# Patient Record
Sex: FEMALE | Race: WHITE | NOT HISPANIC OR LATINO | Employment: OTHER | URBAN - METROPOLITAN AREA
[De-identification: names, ages, dates, MRNs, and addresses within clinical notes are randomized per-mention and may not be internally consistent; named-entity substitution may affect disease eponyms.]

---

## 2017-01-16 ENCOUNTER — HOSPITAL ENCOUNTER (OUTPATIENT)
Dept: MAMMOGRAPHY | Facility: CLINIC | Age: 72
Discharge: HOME | End: 2017-01-16
Payer: MEDICARE

## 2017-01-16 DIAGNOSIS — Z12.31 ENCOUNTER FOR SCREENING MAMMOGRAM FOR MALIGNANT NEOPLASM OF BREAST: ICD-10-CM

## 2017-01-16 PROCEDURE — G0202 SCR MAMMO BI INCL CAD: HCPCS

## 2017-01-23 ENCOUNTER — ALLSCRIPTS OFFICE VISIT (OUTPATIENT)
Dept: OTHER | Facility: OTHER | Age: 72
End: 2017-01-23

## 2017-06-14 ENCOUNTER — APPOINTMENT (OUTPATIENT)
Dept: RADIOLOGY | Facility: CLINIC | Age: 72
End: 2017-06-14
Payer: MEDICARE

## 2017-06-14 ENCOUNTER — ALLSCRIPTS OFFICE VISIT (OUTPATIENT)
Dept: OTHER | Facility: OTHER | Age: 72
End: 2017-06-14

## 2017-06-14 DIAGNOSIS — M17.12 PRIMARY OSTEOARTHRITIS OF LEFT KNEE: ICD-10-CM

## 2017-06-14 DIAGNOSIS — M25.562 PAIN IN LEFT KNEE: ICD-10-CM

## 2017-06-14 DIAGNOSIS — M25.569 PAIN IN KNEE: ICD-10-CM

## 2017-06-14 PROCEDURE — 73562 X-RAY EXAM OF KNEE 3: CPT

## 2017-06-23 ENCOUNTER — GENERIC CONVERSION - ENCOUNTER (OUTPATIENT)
Dept: OTHER | Facility: OTHER | Age: 72
End: 2017-06-23

## 2017-09-13 ENCOUNTER — GENERIC CONVERSION - ENCOUNTER (OUTPATIENT)
Dept: OTHER | Facility: OTHER | Age: 72
End: 2017-09-13

## 2017-09-19 ENCOUNTER — ALLSCRIPTS OFFICE VISIT (OUTPATIENT)
Dept: OTHER | Facility: OTHER | Age: 72
End: 2017-09-19

## 2017-11-22 ENCOUNTER — GENERIC CONVERSION - ENCOUNTER (OUTPATIENT)
Dept: OTHER | Facility: OTHER | Age: 72
End: 2017-11-22

## 2017-12-07 ENCOUNTER — GENERIC CONVERSION - ENCOUNTER (OUTPATIENT)
Dept: OTHER | Facility: OTHER | Age: 72
End: 2017-12-07

## 2017-12-07 ENCOUNTER — HOSPITAL ENCOUNTER (OUTPATIENT)
Dept: NON INVASIVE DIAGNOSTICS | Facility: CLINIC | Age: 72
Discharge: HOME | End: 2017-12-07
Payer: MEDICARE

## 2017-12-07 DIAGNOSIS — I83.92 ASYMPTOMATIC VARICOSE VEINS OF LEFT LOWER EXTREMITY: ICD-10-CM

## 2017-12-07 PROCEDURE — 93970 EXTREMITY STUDY: CPT

## 2017-12-19 ENCOUNTER — ALLSCRIPTS OFFICE VISIT (OUTPATIENT)
Dept: OTHER | Facility: OTHER | Age: 72
End: 2017-12-19

## 2017-12-19 DIAGNOSIS — I83.92 ASYMPTOMATIC VARICOSE VEINS OF LEFT LOWER EXTREMITY: ICD-10-CM

## 2017-12-20 NOTE — PROGRESS NOTES
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)    Discussion/Summary  Discussion Summary:   Reflux study reviewed with patient  Left leg does present with deep venous incompetence however the greater saphenous is surgically absent and no evidence of a perforating veins  I had a long discussion with her in the office today while she does have secondary varicosities which are symptomatic in view of her age group I feel that the wrist possibly outweigh the benefits of the surgery from an anesthesia standpoint  We had further discussion regarding the use of light compression possibly elite are type material which would help compress her secondary varicosities and likely give her some relief  I also asked her to remain active the more active she is the less symptoms she will have from a venous reflux standpoint  Chief Complaint  Chief Complaint Free Text Note Form:  I am here to review my test results  Ms Ines Pop is here to review her LEVDR he had on 12/07/2017  Pt was seen on 09/19/2017  Pt states none of her symptoms have changed since the last time we seen her our office  Pt still c/o burning in her legs as well as achiness/tiredness heaviness feeling in her legs  Pt states her right leg is worse than her left  Pt does wear compression stockings  History of Present Illness  HPI: History of having some aching after long days standing  Review of Systems  Complete Female - Vasc:  Constitutional: No fever or chills, feels well, no tiredness, no recent weight gain or weight loss  Eyes: itching of the eyes,-- no sudden vision loss,-- no blurred vision-- and-- no double vision, but-- no eye pain,-- no eyesight problems,-- no dryness of the eyes,-- eyes not red,-- no purulent discharge from the eyes-- and-- wears glasses  ENT: hearing loss, but-- no earache,-- no nosebleeds,-- no sore throat,-- no nasal discharge-- and-- no hoarseness    Cardiovascular: no bleeding veins, but-- regular heart rate,-- no chest pain,-- no intermittent leg claudication,-- painful veins,-- no palpitations-- and-- leg pain with walking  Respiratory: No sob, no wheezing, no cough, no sob with exertion, no orthopnea  Gastrointestinal: constipation, but-- no nausea,-- no vomiting,-- no diarrhea-- and-- no blood in stools  no hemorrhoids  Genitourinary: no dysuria, no Hematuria,no urinary incontinence  Musculoskeletal: no limb pain, no limb swelling  Integumentary: no rash, no lesions, no wounds, no ulcer  Neurological: no dementia, no headache, no numbness, no limb weakness, no dizziness, no difficulty walking  Psychiatric: depression-- and-- no mood disorder, but-- no anxiety  Hematologic/Lymphatic: no bleeding disorder, no easy bruising  ROS Reviewed:   ROS reviewed  Active Problems  1  Abnormal cardiovascular stress test (794 39) (R94 39)   2  Anxiety (300 00) (F41 9)   3  Asthma (493 90) (J45 909)   4  Cataract of left eye (366 9) (H26 9)   5  Chronic pain of left knee (371 15,565 43) (M25 562,G89 29)   6  Depression (311) (F32 9)   7  Elevated liver enzymes (790 5) (R74 8)   8  Esophagitis, reflux (530 11) (K21 0)   9  Exercise-induced asthma (493 81) (J45 990)   10  Hyperlipidemia (272 4) (E78 5)   11  Hypertension (401 9) (I10)   12  Insomnia (780 52) (G47 00)   13  Knee pain (719 46) (M25 569)   14  Leg cramps (729 82) (R25 2)   15  Lightheadedness (780 4) (R42)   16  Need for revaccination (V05 9) (Z23)   17  Nonalcoholic fatty liver disease (571 8) (K76 0)   18  Osteoarthritis of left knee (715 96) (M17 12)   19  Overactive bladder (596 51) (N32 81)   20  Palpitations (785 1) (R00 2)   21  Postmenopausal atrophic vaginitis (627 3) (N95 2)   22  Sleep apnea (780 57) (G47 30)   23  Varicose veins of left lower extremity (454 9) (I83 92)   24  Ventricular tachycardia (427 1) (I47 2)    Past Medical History  1  History of Acute bronchitis due to Mycoplasma pneumoniae (466 0,041 81) (J20 0)   2  History of Acute lower UTI (urinary tract infection) (599 0) (N39 0)   3  History of acute bronchitis (V12 69) (Z87 09)   4  History of cataract (V12 49) (Z86 69)   5  History of urinary frequency (V13 09) (Z87 898)   6  History of urinary tract infection (V13 02) (Z87 440)   7  History of urinary tract infection (V13 02) (Z87 440)   8  History of Scalp irritation (709 9) (R23 8)   9  Screening for genitourinary condition (V81 6) (Z13 89)   10  History of Vaginal candidiasis (112 1) (B37 3)  Active Problems And Past Medical History Reviewed: The active problems and past medical history were reviewed and updated today  Surgical History  1  History of Abdominoplasty   2  History of Abdomino-Vaginal Vesical Neck Suspension   3  History of Breast Surgery Lumpectomy   4  History of Cataract Surgery   5  History of Knee Arthroscopy (Therapeutic)   6  History of Knee Replacement   7  History of Paravaginal Defect Repair   8  History of Reported Hx Of 'Facelift'   9  History of Tubal Ligation   10  History of Venous Ligation With Stripping  Surgical History Reviewed: The surgical history was reviewed and updated today  Family History  Mother    1  Family history of mental disorder (V17 0) (Z81 8)  Father    2  Family history of cardiac disorder (V17 49) (Z82 49)  Family History Reviewed: The family history was reviewed and updated today  Social History   · History of second hand smoke exposure (V87 39) (Z77 22)   ·    · Minimum alcohol consumption   · Never a smoker   · No drug use   · Retired  Social History Reviewed: The social history was reviewed and updated today  Current Meds   1  Escitalopram Oxalate 20 MG Oral Tablet; Take 1 tablet daily; Therapy: 09MGZ7918 to (Last ZA:03ARS8086)  Requested for: 23Jan2017 Ordered   2  Flovent HFA 44 MCG/ACT Inhalation Aerosol; INHALE 2 PUFFS TWICE DAILY;  Therapy: 39VPH4993 to (Evaluate:58Qgt5841)  Requested for: 90WTF8612; Last Rx:38Rxc2764 Ordered   3  Metoprolol Succinate  MG Oral Tablet Extended Release 24 Hour; TAKE 1 TABLET ONCE DAILY; Therapy: 18KHU5383 to (Evaluate:06Feb2018)  Requested for: 10Aug2017; Last Rx:10Aug2017 Ordered   4  Oxybutynin Chloride ER 10 MG Oral Tablet Extended Release 24 Hour; take one tablet by mouth every day; Therapy: 75HWX9946 to (Evaluate:06Feb2018)  Requested for: 10Aug2017; Last Rx:10Aug2017 Ordered   5  Pantoprazole Sodium 40 MG Oral Tablet Delayed Release; TAKE 1 TABLET DAILY; Therapy: 80Ymd1044 to (Evaluate:27Jan2018)  Requested for: 35EUK8365; Last Rx:10Gmf3489 Ordered   6  ProAir  (90 Base) MCG/ACT Inhalation Aerosol Solution; INHALE 2 PUFFS EVERY 4-6 HOURS AS NEEDED for shortness of breath; Therapy: 73JDI5471 to (Last Rx:90Lpp3579)  Requested for: 79TTX0209 Ordered  Medication List Reviewed: The medication list was reviewed and updated today  Allergies  1  Sanctura   2  Vioxx TABS    Physical Exam    Distal Pulse Exam: Secondary varicosity left lateral thigh  Results/Data  Diagnostic Studies Reviewed Vasc: I personally reviewed the films/images/results in the office today  My interpretation follows  Vascular Study Review Reflux study reviewed        Signatures   Electronically signed by : Rody Winslow MD; Dec 19 2017 10:31AM EST                       (Author)

## 2018-01-11 NOTE — MISCELLANEOUS
Message   Recorded as Task   Date: 05/20/2016 07:21 PM, Created By: Lewis Camargo   Task Name: Follow Up   Assigned To: Lewis Camargo   Regarding Patient: Maria Del Carmen Brumfield, Status: Active   Comment:    Lewis Camargo - 20 May 2016 7:21 PM     TASK CREATED  Call the patient about her LFTs  FarfanJudith - 24 May 2016 10:00 AM     TASK EDITED  I spoke with the patient on 05/23/2016 and reviewed her labs  her LFT's are still elevated and have increased  I am going to send her for an ultrasound of the abdomen with attention to the liver for further evaluation  We will follow-up with results  She should follow-up with cardiology as scheduled  Plan  Elevated liver enzymes    · * US ABDOMEN COMPLETE; Status:Active;  Requested for:02Ruh3370;     Signatures   Electronically signed by : Dina Virk DO; May 24 2016 10:01AM EST                       (Author)

## 2018-01-13 VITALS
BODY MASS INDEX: 32.3 KG/M2 | SYSTOLIC BLOOD PRESSURE: 120 MMHG | DIASTOLIC BLOOD PRESSURE: 68 MMHG | HEIGHT: 66 IN | WEIGHT: 201 LBS | HEART RATE: 60 BPM

## 2018-01-13 NOTE — PROGRESS NOTES
Assessment    1  Encounter for preventive health examination (V70 0) (Z00 00)    Plan  Encounter for screening mammogram for breast cancer    · * MAMMO SCREENING BILATERAL W CAD; Status:Active; Requested for:16Nov2016; Health Maintenance    · Begin a limited exercise program ; Status:Complete;   Done: 71WZA3838   · Eat a low fat and low cholesterol diet ; Status:Complete;   Done: 71NHC9474   · Some eating tips that can help you lose weight ; Status:Complete;   Done: 72NCW8596   · Stretch and warm up your muscles during the first 10 minutes , then cool down your  muscles for the last 10 minutes of exercise ; Status:Complete;   Done: 64UBC0573   · There are many exercise options for seniors ; Status:Complete;   Done: 55LAP1548   · There are ways to decrease your stress and improve your sense of well-being  We  encourage you to keep active and exercise regularly  Make time to take care of yourself  and participate in activities that you enjoy  Stay connected to friends and family that can  support and comfort you  If at any time you have thoughts of harming yourself or  someone else, contact us immediately ; Status:Complete;   Done: 39VNZ9283   · We encourage all of our patients to exercise regularly  30 minutes of exercise or physical  activity five or more days a week is recommended for children and adults ;  Status:Complete;   Done: 35KXW8939   · We recommend that you change your eating habits slowly ; Status:Complete;   Done:  54OXO9576   · We recommend you modify your diet to achieve and maintain a healthy weight  Being  underweight may increase your risk of developing health problems from vitamin and  mineral deficiencies  We recommend a balanced diet rich in fruits and vegetables  You  may also consider increasing your calorie intake by eating more frequently or adding  nuts, avocados, and low-fat cheese or milk to your meals    Please let us know  if you would like to learn more about your nutrition and calorie needs, and additional  options to help you achieve your weight goals ; Status:Complete;   Done: 67GGL5600   · Medicare Annual Wellness Visit; Status:Complete;   Done: 37FNA1023 12:00AM   · Follow-up visit in 1 year Evaluation and Treatment  Follow-up  Status: Complete  Done:  77UKH3874  Hyperlipidemia, Hypertension    · Follow-up visit in 6 months Evaluation and Treatment  Follow-up  Status: Complete   Done: 76YBM2008  Screening for genitourinary condition    · *VB - Urinary Incontinence Screen (Dx Z13 89 Screen for UI); Status:Complete;   Done:  41PZL9102 12:00AM    Discussion/Summary    Brenden Talamantes is doing very well  She will go for the testing as ordered and we will see her in 6 months for a checkup  Impression: Subsequent Annual Wellness Visit, with preventive exam as well as age and risk appropriate counseling completed  Cardiovascular screening and counseling: the risks and benefits of screening were discussed and screening is current  Diabetes screening and counseling: the risks and benefits of screening were discussed and screening is current  Colorectal cancer screening and counseling: the risks and benefits of screening were discussed and screening is current  Breast cancer screening and counseling: the risks and benefits of screening were discussed and screening is current  Cervical cancer screening and counseling: the risks and benefits of screening were discussed  Osteoporosis screening and counseling: the risks and benefits of screening were discussed and screening is current  Abdominal aortic aneurysm screening and counseling: screening not indicated  Glaucoma screening and counseling: the risks and benefits of screening were discussed and screening is current  HIV screening and counseling: the patient declines screening   Immunizations: the risks and benefits of influenza vaccination were discussed with the patient, influenza vaccine is up to date this year, the lifetime pneumococcal vaccine has been completed, hepatitis B vaccination series is not indicated at this time due to the patient's low risk of joslyn the disease, the risks and benefits of the Zostavax vaccine were discussed with the patient, Zostavax vaccination up to date, the risks and benefits of the Td vaccine were discussed with the patient, Td vaccination up to date, the risks and benefits of the Tdap vaccine were discussed with the patient and Tdap vaccination up to date  Advance Directive Planning: complete and up to date  Chief Complaint  Medicare Wellness Visit  Advance Directives  Advance Directive [facility]:   YES - Patient has an advance health care directive  The patient has a living will located  in patient's home  Capacity/Competence: This patient has full decision making capacity for discussion of advance care planning and This patient has full decision making competency for discussion of advance care planning  Summary of Advance Directive Conversation  The patient will bring in a copy  History of Present Illness  HPI: Lynn Yepez is a 73 y/o female who presents for a Medicare Physical today  She is feeling well  She has no complaints  She is stable on her current medications  There are no new problems today  Welcome to [employer] and Wellness Visits: The patient is being seen for the subsequent annual wellness visit  Medicare Screening and Risk Factors   Hospitalizations: no previous hospitalizations and she has been hospitalized 0 times  Once per lifetime medicare screening tests: ECG (sees Cardiology)  Medicare Screening Tests Risk Questions   Abdominal aortic aneurysm risk assessment: none indicated  Osteoporosis risk assessment: , female gender and over 48years of age  HIV risk assessment: none indicated  Drug and Alcohol Use: The patient has never smoked cigarettes  The patient reports occasional alcohol use  She has never used illicit drugs     Diet and Physical Activity: Current diet includes well balanced meals, limited junk food, 2 servings of fruit per day, 2 servings of vegetables per day, 1 servings of meat per day, 1 servings of whole grains per day, 2 servings of simple carbohydrates per day, 3 servings of dairy products per day, 1-2 cups of coffee per day, 0 cups of tea per day, 0 cans of regular soda per day, 0 cans of diet soda per day and Drinks a lot of water  She exercises 3-4 times per week  Exercise: walking 45-60 minutes per day  Mood Disorder and Cognitive Impairment Screening: PHQ-9 Depression Scale   Over the past 2 weeks, how often have you been bothered by the following problems? 1 ) Little interest or pleasure in doing things? Not at all    2 ) Feeling down, depressed or hopeless? Not at all    3 ) Trouble falling asleep or sleeping too much? Not at all    4 ) Feeling tired or having little energy? Not at all    5 ) Poor appetite or overeating? Not at all    6 ) Feeling bad about yourself, or that you are a failure, or have let yourself or your family down? Not at all    7 ) Trouble concentrating on things, such as reading a newspaper or watching television? Not at all    8 ) Moving or speaking so slowly that other people could have noticed, or the opposite, moving or speaking faster than usual? Not at all    9 ) Thoughts that you would be off dead or of hurting yourself in some way? Not at all  TOTAL SCORE: 0    Depression screening  negative for symptoms  She denies feeling down, depressed, or hopeless over the past two weeks  She denies feeling little interest or pleasure in doing things over the past two weeks  Cognitive impairment screening: denies difficulty learning/retaining new information, denies difficulty handling complex tasks, denies difficulty with reasoning, denies difficulty with spatial ability and orientation, denies difficulty with language and denies difficulty with behavior     Functional Ability/Level of Safety: Hearing is slightly decreased and a hearing aid is not used  She reports hearing difficulties  The patient is currently able to do activities of daily living without limitations, able to do instrumental activities of daily living without limitations, able to participate in social activities without limitations and able to drive without limitations  Activities of daily living details: does not need help using the phone, no transportation help needed, does not need help shopping, no meal preparation help needed, does not need help doing housework, does not need help doing laundry, does not need help managing medications and does not need help managing money  Fall risk factors:  polypharmacy, mobility impairment, antidepressant use, urinary incontinence, up and go test was unsteady or greater than thirty seconds and She has had treatments for incontinence  , but The patient fell 0 times in the past 12 months , no alcohol use, no deconditioning, no postural hypotension, no sedative use, no visual impairment, no antihypertensive use, no cognitive impairment and no previous fall  Home safety risk factors:  no grab bars in the bathroom and SHe is looking into getting a grab bar in the bathroom  , but no unfamiliar surroundings, no loose rugs, no poor household lighting, no uneven floors, no household clutter and handrails on the stairs  Co-Managers and Medical Equipment/Suppliers: See Patient Care Team   Reviewed Updated [facility]:   Last Medicare Wellness Visit Information was reviewed, patient interviewed and updates made to the record today        Patient Care Team    Care Team Member Role Specialty Office Number   [facility]  Ophthalmology [phone]   [facility]  Cardiology [phone]   Lainey Mcgowan DO  [facility] Medicine [phone]   Caitlin Ignacio MD  Gastroenterology Adult [phone]   [facility] [address] [phone]     Review of Systems    Constitutional: as noted in HPI  Head and Face: as noted in HPI  Eyes: as noted in HPI    ENT: as noted in HPI  Cardiovascular: as noted in HPI  Respiratory: as noted in HPI  Gastrointestinal: as noted in HPI  Genitourinary: as noted in HPI  Musculoskeletal: as noted in HPI  Active Problems    1  Abnormal cardiovascular stress test (794 39) (R94 39)   2  Anxiety (300 00) (F41 9)   3  Asthma (493 90) (J45 909)   4  Cataract of left eye (366 9) (H26 9)   5  Depression (311) (F32 9)   6  Elevated liver enzymes (790 5) (R74 8)   7  Esophagitis, reflux (530 11) (K21 0)   8  Exercise-induced asthma (493 81) (J45 990)   9  Hyperlipidemia (272 4) (E78 5)   10  Hypertension (401 9) (I10)   11  Insomnia (780 52) (G47 00)   12  Leg cramps (729 82) (R25 2)   13  Lightheadedness (780 4) (R42)   14  Nonalcoholic fatty liver disease (571 8) (K76 0)   15  Overactive bladder (596 51) (N32 81)   16  Palpitations (785 1) (R00 2)   17  Postmenopausal atrophic vaginitis (627 3) (N95 2)   18  Sleep apnea (780 57) (G47 30)   19  Ventricular tachycardia (427 1) (I47 2)    Past Medical History    · History of Acute bronchitis due to Mycoplasma pneumoniae (466 0,041 81) (J20 0)   · History of Acute lower UTI (urinary tract infection) (599 0) (N39 0)   · History of acute bronchitis (V12 69) (Z87 09)   · History of cataract (V12 49) (Z86 69)   · History of urinary frequency (V13 09) (G25 990)   · History of urinary tract infection (V13 02) (Z87 440)   · History of urinary tract infection (V13 02) (Z87 440)   · History of Scalp irritation (709 9) (R23 8)   · History of Vaginal candidiasis (112 1) (B37 3)    The active problems and past medical history were reviewed and updated today        Surgical History    · History of Abdominoplasty   · History of Abdomino-Vaginal Vesical Neck Suspension   · History of Breast Surgery Lumpectomy   · History of Cataract Surgery   · History of Knee Arthroscopy   · History of Knee Replacement   · History of Paravaginal Defect Repair   · History of Reported Hx Of 'Facelift'   · History of Tubal Ligation   · History of Venous Ligation With Stripping    The surgical history was reviewed and updated today  Family History  Mother    · Family history of mental disorder (V17 0) (Z81 8)  Father    · Family history of cardiac disorder (V17 49) (Z82 49)    The family history was reviewed and updated today  Social History    · History of second hand smoke exposure (V87 39) (Z77 22)   ·    · Minimum alcohol consumption   · Never a smoker   · No drug use   · Retired  The social history was reviewed and updated today  The social history was reviewed and is unchanged  Current Meds   1  Escitalopram Oxalate 10 MG Oral Tablet; TAKE 1 AND 1/2 TABLETS DAILY; Therapy: 14XEK4340 to (Evaluate:24Llz0381)  Requested for: 11HEB0962; Last   Rx:13Jun2016 Ordered   2  Flovent HFA 44 MCG/ACT Inhalation Aerosol; INHALE 2 PUFFS TWICE DAILY; Therapy: 69IOO6896 to (Evaluate:09Umy4966)  Requested for: 65NPO6613; Last   Rx:39Nxb5652 Ordered   3  Metoprolol Succinate  MG Oral Tablet Extended Release 24 Hour; TAKE 1   TABLET ONCE DAILY; Therapy: 30PJP7201 to (Evaluate:26Aan5594)  Requested for: 41IXW1272; Last   Rx:13Jun2016 Ordered   4  Oxybutynin Chloride ER 10 MG Oral Tablet Extended Release 24 Hour; take one tablet   by mouth every day; Therapy: 60KXL3137 to (Evaluate:79Sfu8540)  Requested for: 42EIQ0212; Last   Rx:13Jun2016 Ordered   5  Pantoprazole Sodium 40 MG Oral Tablet Delayed Release; TAKE 1 TABLET DAILY; Therapy: 87Yin2790 to (Evaluate:85Ggn6968)  Requested for: 37VDP5941; Last   Rx:13Jun2016 Ordered   6  ProAir  (90 Base) MCG/ACT Inhalation Aerosol Solution; INHALE 2 PUFFS   EVERY 4-6 HOURS AS NEEDED for shortness of breath; Therapy: 32TOP7812 to (Last Rx:57Qqh6210)  Requested for: 68EOV0226 Ordered    Allergies    1  Sanctura   2   Vioxx TABS    Immunizations   1 2 3 4    Hepatitis A 17-Feb-2010  (64y)       Influenza  23-Oct-2013  (67y) 01-Oct-2014  (68y) 15-Sep-2015  (69y) 02-Oct-2016  (70y)    PCV  15-Sep-2015  (69y)       PPSV  13-May-2014  (97Q)       Td/DT  17-Feb-2010  (64y)       Zoster  12-Jun-2008  (62y)        Vitals  Signs    Systolic: 512, RUE, Sitting  Diastolic: 70, RUE, Sitting  Heart Rate: 66, R Radial  Pulse Quality: Regular  Temperature: 97 8 F  Height: 5 ft 5 5 in  Weight: 218 lb   BMI Calculated: 35 73  BSA Calculated: 2 06    Physical Exam    Constitutional   General appearance: No acute distress, well appearing and well nourished  Eyes   Conjunctiva and lids: No swelling, erythema or discharge  Pupils and irises: Equal, round, reactive to light  Ophthalmoscopic examination: Normal fundi and optic discs  Ears, Nose, Mouth, and Throat   External inspection of ears and nose: Normal     Otoscopic examination: Tympanic membranes translucent with normal light reflex  Canals patent without erythema  Hearing: Normal     Nasal mucosa, septum, and turbinates: Normal without edema or erythema  Lips, teeth, and gums: Normal, good dentition  Oropharynx: Normal with no erythema, edema, exudate or lesions  Neck   Neck: Supple, symmetric, trachea midline, no masses  Thyroid: Normal, no thyromegaly  Pulmonary   Respiratory effort: No increased work of breathing or signs of respiratory distress  Percussion of chest: Normal     Palpation of chest: Normal     Auscultation of lungs: Clear to auscultation  Cardiovascular   Palpation of heart: Normal PMI, no thrills  Auscultation of heart: Normal rate and rhythm, normal S1 and S2, no murmurs  Carotid pulses: 2+ bilaterally  Abdominal aorta: Normal     Femoral pulses: 2+ bilaterally  Pedal pulses: 2+ bilaterally  Examination of extremities for edema and/or varicosities: Normal     Chest   Breasts: Normal, no dimpling or skin changes appreciated      Palpation of breasts and axillae: Normal, no masses palpated  Abdomen   Abdomen: Non-tender, no masses  Liver and spleen: No hepatomegaly or splenomegaly  Lymphatic   Palpation of lymph nodes in neck: No lymphadenopathy  Palpation of lymph nodes in axillae: No lymphadenopathy  Palpation of lymph nodes in groin: No lymphadenopathy  Palpation of lymph nodes in other areas: No lymphadenopathy  Musculoskeletal   Gait and station: Normal     Digits and nails: Normal without clubbing or cyanosis  Joints, bones, and muscles: Normal     Range of motion: Normal     Stability: Normal     Muscle strength/tone: Normal     Skin   Skin and subcutaneous tissue: Normal without rashes or lesions  Palpation of skin and subcutaneous tissue: Normal turgor  Neurologic   Cranial nerves: Cranial nerves II-XII intact  Reflexes: 2+ and symmetric  Sensation: No sensory loss  Psychiatric   Judgment and insight: Normal     Orientation to person, place, and time: Normal     Recent and remote memory: Intact  Mood and affect: Normal        Results/Data  Falls Risk Assessment (Dx Z13 89 Screen for Neurologic Disorder) 38DNB5029 01:29PM Jose Armando Mike     Test Name Result Flag Reference   Falls Risk      No falls in the past year     (1) COMPREHENSIVE METABOLIC PANEL 36XPL0764 99:28KW Kareem Seaman Order Number: [id number]   Order Number: [id number]     Test Name Result Flag Reference   GLUCOSE,RANDM 87 mg/dL     If the patient is fasting, the ADA then defines impaired fasting glucose as > 100 mg/dL and diabetes as > or equal to 123 mg/dL     SODIUM 140 mmol/L  136-145   POTASSIUM 4 1 mmol/L  3 5-5 3   CHLORIDE 103 mmol/L  100-108   CARBON DIOXIDE 31 mmol/L  21-32   ANION GAP (CALC) 6 mmol/L  4-13   BLOOD UREA NITROGEN 17 mg/dL  5-25   CREATININE 0 75 mg/dL  0 60-1 30   Standardized to IDMS reference method   CALCIUM 9 9 mg/dL  8 3-10 1   BILI, TOTAL 0 57 mg/dL  0 20-1 00   ALK PHOSPHATAS 82 U/L     ALT (SGPT) 80 U/L H 12-78   AST(SGOT) 91 U/L H 5-45   ALBUMIN 3 9 g/dL  3 5-5 0   TOTAL PROTEIN 8 0 g/dL  6 4-8 2   eGFR Non-African American      >60 0 ml/min/1 73sq m   - Patient Instructions: This is a fasting blood test  Water, black tea or black coffee only after 9:00pm the night before test Drink 2 glasses of water the morning of test - Patient Instructions: This is a fasting blood test  Water, black tea or black   coffee only after 9:00pm the night before test Drink 2 glasses of water the morning of test   National Kidney Disease Education Program recommendations are as follows:  GFR calculation is accurate only with a steady state creatinine  Chronic Kidney disease less than 60 ml/min/1 73 sq  meters  Kidney failure less than 15 ml/min/1 73 sq  meters  (1) LIPID PANEL FASTING W DIRECT LDL REFLEX 32RQF5306 10:31AM Rebecca Burgess    Order Number: [id number]  TW Order Number: [id number]     Test Name Result Flag Reference   CHOLESTEROL 200 mg/dL     LDL CHOLESTEROL CALCULATED 101 mg/dL H 0-100   - Patient Instructions: This is a fasting blood test  Water, black tea or black coffee only after 9:00pm the night before test   Drink 2 glasses of water the morning of test    - Patient Instructions: This is a fasting blood test  Water, black tea or black coffee only after 9:00pm the night before test   Drink 2 glasses of water the morning of test     - Patient Instructions: This is a fasting blood test  Water, black tea or black coffee only after 9:00pm the night before test Drink 2 glasses of water the morning of test - Patient Instructions:  This is a fasting blood test  Water, black tea or black   coffee only after 9:00pm the night before test Drink 2 glasses of water the morning of test   Triglyceride:         Normal              <150 mg/dl       Borderline High    150-199 mg/dl       High               200-499 mg/dl       Very High          >499 mg/dl  Cholesterol:         Desirable        <200 mg/dl Borderline High  200-239 mg/dl      High             >239 mg/dl  HDL Cholesterol:        High    >59 mg/dL      Low     <41 mg/dL  LDL Cholesterol:        Optimal          <100 mg/dl        Near Optimal     100-129 mg/dl        Above Optimal          Borderline High   130-159 mg/dl          High              160-189 mg/dl          Very High        >189 mg/dl  LDL CALCULATED:    This screening LDL is a calculated result  It does not have the accuracy of the Direct Measured LDL in the monitoring of patients with hyperlipidemia and/or statin therapy  Direct Measure LDL (BZI172) must be ordered separately in these patients  TRIGLYCERIDES 283 mg/dL H <=150   Specimen collection should occur prior to N-Acetylcysteine or Metamizole administration due to the potential for falsely depressed results  HDL,DIRECT 42 mg/dL  40-60   Specimen collection should occur prior to Metamizole administration due to the potential for falsely depressed results  Procedure    Procedure:   Results: 20/30 in both eyes with corrective device, 20/30 in the right eye with corrective device, 20/30 in the left eye with corrective device      Health Management  History of Encounter for screening colonoscopy   COLONOSCOPY; every 5 years; Last 67SNQ4962; Next Due: 44MWU5242; Active  History of Encounter for screening mammogram for malignant neoplasm of breast   Digital Bilateral Screening Mammogram With CAD; every 1 year; Last 22PUB8231; Next  Due: 80XLR8029; Overdue  History of Screening for breast cancer   Digital Bilateral Screening Mammogram With CAD; every 1 year; Last 58ANN5616; Next  Due: 73JNO8202;  Overdue    Signatures   Electronically signed by : Abbott Goodpasture, DO; Nov 18 2016  6:26AM EST                       (Author)

## 2018-01-13 NOTE — MISCELLANEOUS
Message  I spoke with the patient and reviewed her testing  The labs demonstrates mild hyperlipidemia, but the patient admits to a poor diet  Her LFT's are elevated- the patient is asymptomatic  I will repeat LFT's on her in 1-2 weeks  Her stress test demonstrates mild reversible anterior ischemia  I would like her to see Cardiology in follow up and I am referring her to [facility]  She will follow up in the ER with any chest pain greater than 20 minutes  She should continue with low dose aspirin  Plan  Exercise intolerance, Palpitations    · *1 - [facility] (CARDIOLOGY ) Physician Referral  Consult  Status:  Hold For - Scheduling  Requested for: [id number]  Care Summary provided  : Yes    Results/Data  Results    (1) CBC/PLT/DIFF   WBC COUNT: 5 75 Thousand/uL Reference Range 4 31-10 16  RBC COUNT: 5 05 Million/uL Reference Range 3 81-5 12  HEMOGLOBIN: 15 1 g/dL Reference Range 11 5-15 4  HEMATOCRIT: 44 1 % Reference Range 34 8-46  1  MCV: 87 fL Reference Range 82-98  MCH: 29 9 pg Reference Range 26 8-34 3  MCHC: 34 2 g/dL Reference Range 31 4-37 4  RDW: 12 4 % Reference Range 11 6-15 1  MPV: 10 6 fL Reference Range 8 9-12 7  PLATELET COUNT: 228 Thousands/uL Reference Range 149-390  nRBC AUTOMATED: 0 /100 WBCs  NEUTROPHILS RELATIVE PERCENT: 65 % Reference Range 43-75  LYMPHOCYTES RELATIVE PERCENT: 27 % Reference Range 14-44  MONOCYTES RELATIVE PERCENT: 6 % Reference Range 4-12  EOSINOPHILS RELATIVE PERCENT: 2 % Reference Range 0-6  BASOPHILS RELATIVE PERCENT: 0 % Reference Range 0-1  NEUTROPHILS ABSOLUTE COUNT: 3 66 Thousands/?L Reference Range 1 85-7 62  LYMPHOCYTES ABSOLUTE COUNT: 1 57 Thousands/?L Reference Range 0 60-4 47  MONOCYTES ABSOLUTE COUNT: 0 33 Thousand/?L Reference Range 0 17-1 22  EOSINOPHILS ABSOLUTE COUNT: 0 14 Thousand/?L Reference Range 0 00-0 61  BASOPHILS ABSOLUTE COUNT: 0 02 Thousands/?L Reference Range 0 00-0 10  (1) COMPREHENSIVE METABOLIC PANEL   SODIUM: 044 mmol/L Reference Range 136-145  POTASSIUM: 4 3 mmol/L Reference Range 3 5-5 3  CHLORIDE: 102 mmol/L Reference Range 100-108  CARBON DIOXIDE: 30 mmol/L Reference Range 21-32  ANION GAP (CALC): 7 mmol/L Reference Range 4-13  BLOOD UREA NITROGEN: 17 mg/dL Reference Range 5-25  CREATININE: 0 69 mg/dL Reference Range 0 60-1 30  GLUCOSE,RANDM: 101 mg/dL Reference Range   CALCIUM: 9 4 mg/dL Reference Range 8 3-10 1  AST(SGOT): 100 U/L Abnormal High Reference Range 5-45  ALT (SGPT): 107 U/L Abnormal High Reference Range 12-78  ALK PHOSPHATAS: 87 U/L Reference Range   TOTAL PROTEIN: 7 7 g/dL Reference Range 6 4-8 2  ALBUMIN: 3 9 g/dL Reference Range 3 5-5 0  BILI, TOTAL: 0 61 mg/dL Reference Range 0 20-1 00  eGFR Non-: >60 0 ml/min/1 73sq m  (1) LDL,DIRECT   LDL, DIRECT: 132 mg/dl Abnormal High Reference Range 0-100  (1) LIPID PANEL, FASTING   CHOLESTEROL: 199 mg/dL Reference Range   TRIGLYCERIDES: 236 mg/dL Abnormal High Reference Range <=150  HDL,DIRECT: 41 mg/dL Reference Range 40-60  LDL CHOLESTEROL CALCULATED: 111 mg/dL Abnormal High Reference Range  0-100  (1) TSH   TSH: 3 320 uIU/mL Reference Range 0 358-3 740  (1) T4, FREE   T4,FREE: 0 91 ng/dL Reference Range 0 76-1 46   NM MYOCARDIAL PERFUSION SPECT (RX STRESS AND/OR REST)   NM MYOCARDIAL PERFUSION SPECT (RX STRESS AND/OR REST): [facility], [address] [place] [address]   [phone]     Rest/Stress Gated SPECT Myocardial Perfusion Imaging After Regadenoson     Patient: Kenny Ryan   MR number: [id number]   Account number: [de-identified]   : 1945   Age: 72 years   Gender: Female   Status: Outpatient   Location: [facility]   Height: 66 in   Weight: 217 lb   BP: 138/ 69 mmHg     Allergies: ROFECOXIB     Diagnosis: I10  - Essential (primary) hypertension, R00 2 - Palpitations, R42    - Dizziness and giddiness     CC:  Hernan Villarreal DO   RN:  Yumiko Mcdermott Inge Reese RN   Referring Physician:  Juliana Reyes DO   Technician:  Sherice Rivas   Group:  [facility]   Report Prepared By[de-identified]  Melvin Chakraborty RN   Interpreting Physician:  Dary Reddy MD     INDICATIONS: Detection of coronary artery disease  HISTORY: The patient is a 72year old  female  Chest pain status: no   chest pain  Other symptoms: dyspnea of recent onset  Coronary artery disease   risk factors: dyslipidemia and hypertension  Cardiovascular history: none   significant  Prior cardiovascular procedures: percutaneous transluminal   coronary angioplasty  No stents     PHYSICAL EXAM: Baseline physical exam screening: no wheezes audible  REST ECG: LAD Normal sinus rhythm  Nonspecific ST and T wave abnormalities were   present  PROCEDURE: The study was performed at the [address] [place] [address]  A regadenoson infusion pharmacologic stress test was performed  Gated SPECT   myocardial perfusion imaging was performed after stress and at rest  Systolic   blood pressure was 138 mmHg, at the start of the study  Diastolic blood   pressure was 69 mmHg, at the start of the study  The heart rate was 60 bpm,  at   the start of the study  IV double checked  Regadenoson protocol:   HR bpm SBP mmHg DBP mmHg Symptoms   Baseline 60 138 69 none   1 min 88 -- -- none   2 min 91 -- -- none   3 min 86 147 70 none   4 min 69 152 74 none   5 min 64 -- -- none   6 min 63 131 74 none     STRESS SUMMARY: Duration of pharmacologic stress was Maximal heart rate during   stress was 92 bpm  The rate-pressure product for the peak heart rate and blood   pressure was 55932  There was no chest pain during stress  The stress test was   terminated due to protocol completion  Pre oxygen saturation: 93 %  Peak oxygen   saturation: 96 %  The stress ECG was negative for ischemia  There were no   stress arrhythmias or conduction abnormalities       IMAGE PROPERTIES:   Chest: circumference 42 in, C cup  MYOCARDIAL PERFUSION IMAGING:   Mild decrease in uptake of tracer into a small to moderate portion of the   anterior segment of the LV with improvement in rest imaging The image quality   was good  GATED SPECT:   The calculated left ventricular ejection fraction was 57 %  SUMMARY:   -  Stress results: There was no chest pain during stress  -  ECG conclusions: The stress ECG was negative for ischemia    -  Gated SPECT: The calculated left ventricular ejection fraction was 57 %  IMPRESSIONS: Abnormal study after pharmacologic vasodilation without   reproduction of symptoms  A mild reversible anterior defect is suggested   possibly c/w coronary ischemia       Prepared and signed by     Margaret Campo MD   Signed 05/04/2016 14:30:58     Signatures   Electronically signed by : Millie Turcios DO; May  5 2016  2:10PM EST                       (Author)

## 2018-01-13 NOTE — MISCELLANEOUS
Message   Recorded as Task   Date: 06/18/2016 09:05 AM, Created By: Nora Espinoza   Task Name: Follow Up   Assigned To: Nora Espinoza   Regarding Patient: Dalila Hernandez, Status: Active   Comment:    Nora Espinoza - 18 Jun 2016 9:05 AM     TASK CREATED  Call the patient about her [phone]  Judith Farfan - 22 Jun 2016 6:26 PM     TASK EDITED   Nora Espinoza - 23 Jun 2016 6:57 PM     TASK EDITED   Nora Espinoza - 24 Jun 2016 5:58 PM     TASK EDITED  I spoke with the patient  She is scheduled for her cardiac catheterization on July 8, 2016  We will follow-up very closely with the results of this  In addition, I reviewed the results of her ultrasound of the abdomen which demonstrated steatohepatitis  We will eventually refer her to GI for further evaluation of this after catheterization  I advised her to follow low-fat diet and cut back on her portion sizes  We will recheck her LFTs in a few weeks  Plan  Elevated liver enzymes    · (1) GGT; Status:Active; Requested for:71Nwi3454;    · (1) HEPATIC FUNCTION PANEL; Status:Active;  Requested for:21Rio5054;     Signatures   Electronically signed by : Melanie Esposito DO; Jun 24 2016  6:00PM EST                       (Author)

## 2018-01-14 VITALS
SYSTOLIC BLOOD PRESSURE: 122 MMHG | DIASTOLIC BLOOD PRESSURE: 70 MMHG | HEIGHT: 66 IN | WEIGHT: 216.5 LBS | HEART RATE: 60 BPM | BODY MASS INDEX: 34.79 KG/M2

## 2018-01-16 NOTE — PROGRESS NOTES
Assessment    1  Varicose veins of left lower extremity (454 9) (R36 46)    Plan    1  Gradient compression stocking, below knee, 15-20 mm Hg, each; Status:Complete;     Done: 80PUY1317    2  VAS REFLUX LOWER LIMB   ; Status:Hold For - Scheduling; Requested for:66Dcs9572;     Discussion/Summary  Discussion Summary:   Symptomatic varicosities left lower extremity with heaviness and aching after long day standing as well as burning  She had a laser ablation around 2002 but has noticed gradual progression of her varicosities  We are ordering custom graduated compression stockings and will see her back in the office in 3 months with the results of a reflux study to see how she is doing from a symptom standpoint to discuss either continuing conservative therapy or possible laser ablation re-treatment  Counseling Documentation With Imm: The patient was counseled regarding instructions for management, risk factor reductions, prognosis, risks and benefits of treatment options  total time of encounter was 20 minutes and 15 minutes was spent counseling  Chief Complaint  Chief Complaint Free Text Note Form: "I am here to have my varicose veins checked "    Patient is new to our practice and was referred by Dr Beba Nolasco  She has not had any testing done  She complains of bulging veins in her legs  She states that she gets a burning pain in her legs and that they feel tired and heavy  She elevates her legs when she can, but does not wear compression stockings  She states that her right leg is worse  She has notice that she had varicose veins for over a year  She had a hx of left EVLT in 2002      History of Present Illness  Varicose Veins [facility]: The patient is being seen for a consultation regarding varicose veins  Symptoms:  left leg swelling and left leg pain  The patient describes the pain as aching and heavy  These symptoms developed many year(s) ago     Evaluation and Treatment History: Review of Systems  Complete Female - Vasc:   Constitutional: No fever or chills, feels well, no tiredness, no recent weight gain or weight loss  Eyes: itching of the eyes, no sudden vision loss, no blurred vision and no double vision, but no eye pain, no eyesight problems, no dryness of the eyes, eyes not red, no purulent discharge from the eyes and wears glasses  ENT: hearing loss, but no earache, no nosebleeds, no sore throat, no nasal discharge and no hoarseness  Cardiovascular: no bleeding veins, but regular heart rate, no chest pain, no intermittent leg claudication, painful veins, no palpitations and leg pain with walking  Respiratory: No sob, no wheezing, no cough, no sob with exertion, no orthopnea  Gastrointestinal: constipation, but no nausea, no vomiting, no diarrhea and no blood in stools  no hemorrhoids   Genitourinary: no dysuria, no Hematuria,no urinary incontinence  Musculoskeletal: no limb pain, no limb swelling  Integumentary: no rash, no lesions, no wounds, no ulcer  Neurological: no dementia, no headache, no numbness, no limb weakness, no dizziness, no difficulty walking  Psychiatric: depression and no mood disorder, but no anxiety  Hematologic/Lymphatic: no bleeding disorder, no easy bruising  ROS Reviewed:   ROS reviewed  Active Problems    1  Abnormal cardiovascular stress test (794 39) (R94 39)   2  Anxiety (300 00) (F41 9)   3  Asthma (493 90) (J45 909)   4  Cataract of left eye (366 9) (H26 9)   5  Chronic pain of left knee (230 18,484 40) (M25 562,G89 29)   6  Depression (311) (F32 9)   7  Elevated liver enzymes (790 5) (R74 8)   8  Esophagitis, reflux (530 11) (K21 0)   9  Exercise-induced asthma (493 81) (J45 990)   10  Hyperlipidemia (272 4) (E78 5)   11  Hypertension (401 9) (I10)   12  Insomnia (780 52) (G47 00)   13  Knee pain (719 46) (M25 569)   14  Leg cramps (729 82) (R25 2)   15  Lightheadedness (780 4) (R42)   16   Need for revaccination (V05 9) (Z23)   17  Nonalcoholic fatty liver disease (571 8) (K76 0)   18  Osteoarthritis of left knee (715 96) (M17 12)   19  Overactive bladder (596 51) (N32 81)   20  Palpitations (785 1) (R00 2)   21  Postmenopausal atrophic vaginitis (627 3) (N95 2)   22  Sleep apnea (780 57) (G47 30)   23  Varicose veins of left lower extremity (454 9) (I83 92)   24  Ventricular tachycardia (427 1) (I47 2)    Past Medical History    1  History of Acute bronchitis due to Mycoplasma pneumoniae (466 0,041 81) (J20 0)   2  History of Acute lower UTI (urinary tract infection) (599 0) (N39 0)   3  History of acute bronchitis (V12 69) (Z87 09)   4  History of cataract (V12 49) (Z86 69)   5  History of urinary frequency (V13 09) (Z87 898)   6  History of urinary tract infection (V13 02) (Z87 440)   7  History of urinary tract infection (V13 02) (Z87 440)   8  History of Scalp irritation (709 9) (R23 8)   9  Screening for genitourinary condition (V81 6) (Z13 89)   10  History of Vaginal candidiasis (112 1) (B37 3)  Active Problems And Past Medical History Reviewed: The active problems and past medical history were reviewed and updated today  Surgical History  Surgical History Reviewed: The surgical history was reviewed and updated today  Family History  Mother    1  Family history of mental disorder (V17 0) (Z81 8)  Father    2  Family history of cardiac disorder (V17 49) (Z82 49)  Family History Reviewed: The family history was reviewed and updated today  Social History    · History of second hand smoke exposure (V87 39) (Z77 22)   ·    · Minimum alcohol consumption   · Never a smoker   · No drug use   · Retired  Social History Reviewed: The social history was reviewed and updated today  Current Meds   1  Escitalopram Oxalate 20 MG Oral Tablet; Take 1 tablet daily; Therapy: 62OVP6011 to (Last :27RIX6728)  Requested for: 23Jan2017 Ordered   2   Flovent HFA 44 MCG/ACT Inhalation Aerosol; INHALE 2 PUFFS TWICE DAILY; Therapy: 17MGS2147 to (Evaluate:42Ace3733)  Requested for: 56XHR6745; Last   Rx:02Glu3924 Ordered   3  Metoprolol Succinate  MG Oral Tablet Extended Release 24 Hour; TAKE 1 TABLET   ONCE DAILY; Therapy: 25COX6757 to (Evaluate:06Feb2018)  Requested for: 10Aug2017; Last   Rx:10Aug2017 Ordered   4  Oxybutynin Chloride ER 10 MG Oral Tablet Extended Release 24 Hour; take one tablet by   mouth every day; Therapy: 08PKB2610 to (Evaluate:06Feb2018)  Requested for: 10Aug2017; Last   Rx:10Aug2017 Ordered   5  Pantoprazole Sodium 40 MG Oral Tablet Delayed Release; TAKE 1 TABLET DAILY; Therapy: 24Iox2291 to (Evaluate:27Jan2018)  Requested for: 59IRT5067; Last   Rx:44Grk9602 Ordered   6  ProAir  (90 Base) MCG/ACT Inhalation Aerosol Solution; INHALE 2 PUFFS   EVERY 4-6 HOURS AS NEEDED for shortness of breath; Therapy: 38JSN2205 to (Last Rx:55Jxg3974)  Requested for: 66LTT6853 Ordered  Medication List Reviewed: The medication list was reviewed and updated today  Allergies    1  Sanctura   2  Vioxx TABS    Vitals  Vital Signs    Recorded: 19Sep2017 09:06AM   Heart Rate 64, R Radial   Pulse Quality Regular, R Radial   Respiration Quality Normal   Respiration 16   Systolic 175, RUE, Sitting   Diastolic 80, RUE, Sitting   Height 5 ft 6 in   Weight 205 lb    BMI Calculated 33 09   BSA Calculated 2 02     Physical Exam    Carotid: right 2+, no bruit heard on the right, left 2+ and no bruit on the left  Brachial: right 2+ and left 2+  Radial: right 2+ and left 2+  Femoral: right 2+, no bruit heard on the right, left 2+ and no bruit on the left  Popliteal: right 2+ and left 2+  Posterior tibialis: right 2+ and left 2+  Dorsalis pedis: right 2+ and left 2+  Distal Pulse Exam: Normal Capillary Refill  Extremities: No upper or lower extremity edema  LE Varicose Veins: left leg truncal veins  The heart rate was normal  Heart sounds: normal S1, normal S2, no S3 and no S4  Murmurs: No murmurs were heard  Pulmonary   Respiratory effort: No increased work of breathing or signs of respiratory distress  Auscultation of lungs: Clear to auscultation  No wheezing, no rales, no rhonchi  Abdomen   Abdomen: Abdomen soft, non-tender, no masses, non distended, no rebound tenderness  No palpable aneurysm  No bruit heard  Psychiatric   Orientation to person, place and time: Normal    Mood and affect: Normal    Eyes   Conjunctiva and lids: No swelling, erythema, or discharge  Pupils and irises: Equal, round and reactive to light  Ears, Nose, Mouth, and Throat   Hearing: Normal    Neck   Neck: No jugular venous distention, normal ROM and extension  No cervical adenopathy, trachea midline, neck supple  Thyroid: Normal, no thyromegaly  Neurologic   Cranial nerves: 2-12 intact  Motor skills intact  Musculoskeletal   Gait and station: Normal    Digits and nails: Normal without clubbing or cyanosis  Range of motion: Normal    Muscle strength/tone: Normal    Skin   Skin and subcutaneous tissue: Normal without rashes or lesions  Palpation of skin and subcutaneous tissue: Normal turgor     Venous Disease: No lipodermatosclerosis, stasis dermatitis, hyperpigmentation, or atrophie sridhar noted on exam       Signatures   Electronically signed by : Julian Dan MD; Sep 19 2017  9:31AM EST                       (Author)    Electronically signed by : Julian Dan MD; Sep 19 2017  9:35AM EST                       (Author)

## 2018-01-16 NOTE — MISCELLANEOUS
Message   Recorded as Task   Date: 06/17/2017 12:16 PM, Created By: Richa Echols   Task Name: Go to Result   Assigned To: Neris Hirsch   Regarding Patient: Jigna David, Status: In Progress   Comment:    Judith Farfan - 17 Jun 2017 12:16 PM     TASK CREATED  Please let the patient know that the x-ray of her left knee was unremarkable  I would recommend a trial physical therapy 3 times a week for 3 weeks to help with her chronic knee pain  We can give her an order for this  Neris Hirsch - 19 Jun 2017 1:22 PM     TASK EDITED  Left message call office  trb   Neris Hirsch - [phone] [id number] [phone] 1:23 PM     TASK IN PROGRESS   AndriyApril - 20 Jun 2017 10:06 AM     TASK REPLIED TO: Previously Assigned To India Jiménez                patient called back  she was informed of results  would like you to mail her the physical therapy order  06/23/2017 April, another nurse spoke to her and gave her message, mailed to her requisition for Physical Therapy for [facility]  tr      Active Problems    1  Abnormal cardiovascular stress test (794 39) (R94 39)   2  Anxiety (300 00) (F41 9)   3  Asthma (493 90) (J45 909)   4  Cataract of left eye (366 9) (H26 9)   5  Chronic pain of left knee (100 17,716 93) (M25 562,G89 29)   6  Depression (311) (F32 9)   7  Elevated liver enzymes (790 5) (R74 8)   8  Esophagitis, reflux (530 11) (K21 0)   9  Exercise-induced asthma (493 81) (J45 990)   10  Hyperlipidemia (272 4) (E78 5)   11  Hypertension (401 9) (I10)   12  Insomnia (780 52) (G47 00)   13  Knee pain (719 46) (M25 569)   14  Leg cramps (729 82) (R25 2)   15  Lightheadedness (780 4) (R42)   16  Need for revaccination (V05 9) (Z23)   17  Nonalcoholic fatty liver disease (571 8) (K76 0)   18  Osteoarthritis of left knee (715 96) (M17 12)   19  Overactive bladder (596 51) (N32 81)   20  Palpitations (785 1) (R00 2)   21  Postmenopausal atrophic vaginitis (627 3) (N95 2)   22  Sleep apnea (780 57) (G47 30)   23   Varicose veins of left lower extremity (454 9) (I83 92)   24  Ventricular tachycardia (427 1) (I47 2)    Current Meds   1  Escitalopram Oxalate 20 MG Oral Tablet; Take 1 tablet daily; Therapy: 54SHN0473 to (Last MB:52TIB4329)  Requested for: 23Jan2017 Ordered   2  Flovent HFA 44 MCG/ACT Inhalation Aerosol; INHALE 2 PUFFS TWICE DAILY; Therapy: 08ZXJ0427 to (Evaluate:61Wky3580)  Requested for: 10SSE3025; Last   Rx:26Eya9975 Ordered   3  Metoprolol Succinate  MG Oral Tablet Extended Release 24 Hour (Toprol XL);   TAKE 1 TABLET ONCE DAILY; Therapy: 00IOZ4640 to (Evaluate:25Jun2017)  Requested for: 07Evr1399; Last   Rx:47Rtq2592 Ordered   4  Oxybutynin Chloride ER 10 MG Oral Tablet Extended Release 24 Hour; take one tablet   by mouth every day; Therapy: 33TXG4801 to (Evaluate:25Jun2017)  Requested for: 66Mwb4231; Last   Rx:67Hgl2949 Ordered   5  Pantoprazole Sodium 40 MG Oral Tablet Delayed Release; TAKE 1 TABLET DAILY; Therapy: 26Ioy3152 to (Evaluate:25Jun2017)  Requested for: 43Piy6337; Last   Rx:95Ovo2274 Ordered   6  ProAir  (90 Base) MCG/ACT Inhalation Aerosol Solution; INHALE 2 PUFFS   EVERY 4-6 HOURS AS NEEDED for shortness of breath; Therapy: 47TEX9096 to (Last Rx:67Jfa7241)  Requested for: 20WIO0477 Ordered    Allergies    1  Sanctura   2  Vioxx TABS    Signatures   Electronically signed by :  Littie Spurling, ; Jun 23 2017 11:56AM EST                       (Author)

## 2018-01-16 NOTE — MISCELLANEOUS
Provider Comments  Provider Comments:   12/21/16 NO SHOW [facility] MED[facility] VF      Signatures   Electronically signed by : Dina Virk DO; Dec 21 2016  4:35PM EST                       (Author)

## 2018-01-22 VITALS
SYSTOLIC BLOOD PRESSURE: 136 MMHG | RESPIRATION RATE: 18 BRPM | HEIGHT: 66 IN | DIASTOLIC BLOOD PRESSURE: 84 MMHG | TEMPERATURE: 98 F | HEART RATE: 77 BPM

## 2018-01-22 VITALS
WEIGHT: 205 LBS | SYSTOLIC BLOOD PRESSURE: 130 MMHG | BODY MASS INDEX: 32.95 KG/M2 | DIASTOLIC BLOOD PRESSURE: 80 MMHG | RESPIRATION RATE: 16 BRPM | HEIGHT: 66 IN | HEART RATE: 64 BPM

## 2018-02-01 ENCOUNTER — TELEPHONE (OUTPATIENT)
Dept: FAMILY MEDICINE CLINIC | Facility: CLINIC | Age: 73
End: 2018-02-01

## 2018-02-01 PROBLEM — M17.12 OSTEOARTHRITIS OF LEFT KNEE: Status: ACTIVE | Noted: 2017-06-14

## 2018-02-01 PROBLEM — G89.29 CHRONIC PAIN OF LEFT KNEE: Status: ACTIVE | Noted: 2017-06-14

## 2018-02-01 PROBLEM — M25.562 CHRONIC PAIN OF LEFT KNEE: Status: ACTIVE | Noted: 2017-06-14

## 2018-02-01 PROBLEM — I83.92 VARICOSE VEINS OF LEFT LOWER EXTREMITY: Status: ACTIVE | Noted: 2017-06-14

## 2018-02-01 RX ORDER — ESCITALOPRAM OXALATE 20 MG/1
1 TABLET ORAL DAILY
COMMUNITY
Start: 2014-11-25 | End: 2018-10-31 | Stop reason: SDUPTHER

## 2018-02-01 RX ORDER — FLUTICASONE PROPIONATE 44 UG/1
2 AEROSOL, METERED RESPIRATORY (INHALATION) 2 TIMES DAILY
COMMUNITY
Start: 2016-07-14 | End: 2020-01-23 | Stop reason: ALTCHOICE

## 2018-02-01 RX ORDER — ALBUTEROL SULFATE 90 UG/1
AEROSOL, METERED RESPIRATORY (INHALATION) EVERY 6 HOURS PRN
COMMUNITY
Start: 2016-07-14 | End: 2020-01-23 | Stop reason: ALTCHOICE

## 2018-02-01 RX ORDER — OXYBUTYNIN CHLORIDE 10 MG/1
1 TABLET, EXTENDED RELEASE ORAL DAILY
COMMUNITY
Start: 2014-03-14 | End: 2018-03-26 | Stop reason: SDUPTHER

## 2018-02-01 RX ORDER — PANTOPRAZOLE SODIUM 40 MG/1
1 TABLET, DELAYED RELEASE ORAL DAILY
COMMUNITY
Start: 2015-09-15 | End: 2018-03-30 | Stop reason: SDUPTHER

## 2018-02-01 RX ORDER — METOPROLOL SUCCINATE 100 MG/1
1 TABLET, EXTENDED RELEASE ORAL DAILY
COMMUNITY
Start: 2012-04-24 | End: 2018-03-26 | Stop reason: SDUPTHER

## 2018-02-01 NOTE — LETTER
Date: 2/1/2018    To whom it may concern: This is to certify that Gomez Ames has been under my care for the following diagnosis: Overactive Bladder  She is unable to remain seated for long periods of time due to this condition and the medications she is taking  I feel that she is unable to serve on Jury Duty at this time for the above mentioned medical reasons                    Sincerely,   Roxie Zaman, DO

## 2018-03-07 NOTE — PROGRESS NOTES
History of Present Illness    Revaccination   Vaccine Information: Vaccine(s) Given (names): Mirta Baca [id number]  Spoke with patient regarding vaccine out of temperature range  Action(s): Pt will be revaccinated  Appointment scheduled: [id number]  Other Information: [phone] Patient contacted and will revaccinate on 12/21/2016  DJB  [phone] [id number] Patient was in the office today for a visit with Dr Perlita Sims  She had apparently cancelled her revac appt on 12/21/2016 with [facility] and our office was unaware  Discussed revac with patient today during her visit and she received her Prevnar revac from 9/15/2015  DJB  Revaccination Completed: [id number]  Active Problems    1  Abnormal cardiovascular stress test (794 39) (R94 39)   2  Asthma (493 90) (J45 909)   3  Cataract of left eye (366 9) (H26 9)   4  Elevated liver enzymes (790 5) (R74 8)   5  Esophagitis, reflux (530 11) (K21 0)   6  Exercise-induced asthma (493 81) (J45 990)   7  Hyperlipidemia (272 4) (E78 5)   8  Hypertension (401 9) (I10)   9  Insomnia (780 52) (G47 00)   10  Leg cramps (729 82) (R25 2)   11  Lightheadedness (780 4) (R42)   12  Need for revaccination (V05 9) (Z23)   13  Nonalcoholic fatty liver disease (571 8) (K76 0)   14  Overactive bladder (596 51) (N32 81)   15  Palpitations (785 1) (R00 2)   16  Postmenopausal atrophic vaginitis (627 3) (N95 2)   17  Sleep apnea (780 57) (G47 30)   18  Ventricular tachycardia (427 1) (I47 2)    Immunizations  Hepatitis A --- [facility]: 17-Feb-2010  (23D)   Influenza --- [facility]: 23-Oct-2013  (67y); Series2: 01-Oct-2014  (47D); Angelica Kirk: 15-Sep-2015   (65Y); Series4: 02-Oct-2016  (70y)   PCV --- [facility]: 15-Sep-2015  (87Z)   PPSV --- [facility]: 13-May-2014  (48Q)   Td/DT --- [facility]: 17-Feb-2010  (64y)   Zoster --- Series1: 12-Jun-2008  (62y)     Current Meds   1   Escitalopram Oxalate 10 MG Oral Tablet; TAKE 1 AND 1/2 TABLETS DAILY   2  Flovent HFA 44 MCG/ACT Inhalation Aerosol; INHALE 2 PUFFS TWICE DAILY   3  Metoprolol Succinate  MG Oral Tablet Extended Release 24 Hour; TAKE 1 TABLET   ONCE DAILY   4  Oxybutynin Chloride ER 10 MG Oral Tablet Extended Release 24 Hour; take one tablet by   mouth every day   5  Pantoprazole Sodium 40 MG Oral Tablet Delayed Release; TAKE 1 TABLET DAILY   6  ProAir  (90 Base) MCG/ACT Inhalation Aerosol Solution; INHALE 2 PUFFS   EVERY 4-6 HOURS AS NEEDED for shortness of breath    Allergies    1  Sanctura   2   Vioxx TABS    Signatures   Electronically signed by : Cecilio Hearn DO; Jan 24 2017  8:08AM EST                       (Author)

## 2018-03-26 DIAGNOSIS — I10 ESSENTIAL HYPERTENSION: Primary | ICD-10-CM

## 2018-03-26 DIAGNOSIS — N39.46 MIXED STRESS AND URGE URINARY INCONTINENCE: ICD-10-CM

## 2018-03-27 RX ORDER — OXYBUTYNIN CHLORIDE 10 MG/1
10 TABLET, EXTENDED RELEASE ORAL DAILY
Qty: 90 TABLET | Refills: 1 | Status: SHIPPED | OUTPATIENT
Start: 2018-03-27 | End: 2018-10-31 | Stop reason: SDUPTHER

## 2018-03-27 RX ORDER — METOPROLOL SUCCINATE 100 MG/1
100 TABLET, EXTENDED RELEASE ORAL DAILY
Qty: 90 TABLET | Refills: 1 | Status: SHIPPED | OUTPATIENT
Start: 2018-03-27 | End: 2018-10-31 | Stop reason: SDUPTHER

## 2018-03-30 DIAGNOSIS — K21.00 ESOPHAGITIS, REFLUX: Primary | ICD-10-CM

## 2018-03-30 RX ORDER — PANTOPRAZOLE SODIUM 40 MG/1
40 TABLET, DELAYED RELEASE ORAL DAILY
Qty: 90 TABLET | Refills: 1 | Status: SHIPPED | OUTPATIENT
Start: 2018-03-30 | End: 2018-10-31 | Stop reason: SDUPTHER

## 2018-04-17 ENCOUNTER — TELEPHONE (OUTPATIENT)
Dept: FAMILY MEDICINE CLINIC | Facility: CLINIC | Age: 73
End: 2018-04-17

## 2018-04-17 ENCOUNTER — TRANSCRIBE ORDERS (OUTPATIENT)
Dept: ADMINISTRATIVE | Facility: HOSPITAL | Age: 73
End: 2018-04-17

## 2018-04-17 DIAGNOSIS — Z12.31 VISIT FOR SCREENING MAMMOGRAM: Primary | ICD-10-CM

## 2018-04-17 DIAGNOSIS — Z12.31 ENCOUNTER FOR SCREENING MAMMOGRAM FOR BREAST CANCER: Primary | ICD-10-CM

## 2018-04-17 NOTE — TELEPHONE ENCOUNTER
[facility] is requesting a script for a routine mammogram to be put into the system please       [phone]

## 2018-05-07 ENCOUNTER — HOSPITAL ENCOUNTER (OUTPATIENT)
Dept: MAMMOGRAPHY | Facility: CLINIC | Age: 73
Discharge: HOME | End: 2018-05-07
Payer: MEDICARE

## 2018-05-07 DIAGNOSIS — Z12.31 ENCOUNTER FOR SCREENING MAMMOGRAM FOR BREAST CANCER: ICD-10-CM

## 2018-05-07 DIAGNOSIS — Z12.31 VISIT FOR SCREENING MAMMOGRAM: ICD-10-CM

## 2018-05-07 PROCEDURE — 77067 SCR MAMMO BI INCL CAD: CPT

## 2018-10-29 DIAGNOSIS — I10 ESSENTIAL HYPERTENSION: ICD-10-CM

## 2018-10-29 DIAGNOSIS — N39.46 MIXED STRESS AND URGE URINARY INCONTINENCE: ICD-10-CM

## 2018-10-30 RX ORDER — METOPROLOL SUCCINATE 100 MG/1
TABLET, EXTENDED RELEASE ORAL
Qty: 90 TABLET | Refills: 1 | OUTPATIENT
Start: 2018-10-30

## 2018-10-30 RX ORDER — ESCITALOPRAM OXALATE 20 MG/1
TABLET ORAL
Qty: 90 TABLET | Refills: 2 | OUTPATIENT
Start: 2018-10-30

## 2018-10-30 RX ORDER — OXYBUTYNIN CHLORIDE 10 MG/1
TABLET, EXTENDED RELEASE ORAL
Qty: 90 TABLET | Refills: 1 | OUTPATIENT
Start: 2018-10-30

## 2018-10-31 DIAGNOSIS — N39.46 MIXED STRESS AND URGE URINARY INCONTINENCE: ICD-10-CM

## 2018-10-31 DIAGNOSIS — K21.00 ESOPHAGITIS, REFLUX: ICD-10-CM

## 2018-10-31 DIAGNOSIS — F41.9 ANXIETY: ICD-10-CM

## 2018-10-31 DIAGNOSIS — F32.A DEPRESSION, UNSPECIFIED DEPRESSION TYPE: Primary | ICD-10-CM

## 2018-10-31 DIAGNOSIS — I10 ESSENTIAL HYPERTENSION: ICD-10-CM

## 2018-10-31 RX ORDER — METOPROLOL SUCCINATE 100 MG/1
TABLET, EXTENDED RELEASE ORAL
Qty: 90 TABLET | Refills: 1 | Status: SHIPPED | OUTPATIENT
Start: 2018-10-31 | End: 2019-01-22 | Stop reason: SDUPTHER

## 2018-10-31 RX ORDER — ESCITALOPRAM OXALATE 20 MG/1
20 TABLET ORAL DAILY
Qty: 90 TABLET | Refills: 1 | Status: SHIPPED | OUTPATIENT
Start: 2018-10-31 | End: 2019-01-22 | Stop reason: SDUPTHER

## 2018-10-31 RX ORDER — OXYBUTYNIN CHLORIDE 10 MG/1
TABLET, EXTENDED RELEASE ORAL
Qty: 90 TABLET | Refills: 1 | Status: SHIPPED | OUTPATIENT
Start: 2018-10-31 | End: 2019-01-22 | Stop reason: SDUPTHER

## 2018-10-31 RX ORDER — PANTOPRAZOLE SODIUM 40 MG/1
40 TABLET, DELAYED RELEASE ORAL DAILY
Qty: 90 TABLET | Refills: 1 | Status: SHIPPED | OUTPATIENT
Start: 2018-10-31 | End: 2019-01-22 | Stop reason: SDUPTHER

## 2019-01-22 ENCOUNTER — TELEPHONE (OUTPATIENT)
Dept: FAMILY MEDICINE CLINIC | Facility: CLINIC | Age: 74
End: 2019-01-22

## 2019-01-22 DIAGNOSIS — F41.9 ANXIETY: ICD-10-CM

## 2019-01-22 DIAGNOSIS — I10 ESSENTIAL HYPERTENSION: ICD-10-CM

## 2019-01-22 DIAGNOSIS — K21.00 ESOPHAGITIS, REFLUX: ICD-10-CM

## 2019-01-22 DIAGNOSIS — F32.A DEPRESSION, UNSPECIFIED DEPRESSION TYPE: ICD-10-CM

## 2019-01-22 DIAGNOSIS — N39.46 MIXED STRESS AND URGE URINARY INCONTINENCE: ICD-10-CM

## 2019-01-22 RX ORDER — PANTOPRAZOLE SODIUM 40 MG/1
40 TABLET, DELAYED RELEASE ORAL DAILY
Qty: 90 TABLET | Refills: 0 | Status: SHIPPED | OUTPATIENT
Start: 2019-01-22 | End: 2019-01-23 | Stop reason: SDUPTHER

## 2019-01-22 RX ORDER — OXYBUTYNIN CHLORIDE 10 MG/1
10 TABLET, EXTENDED RELEASE ORAL DAILY
Qty: 90 TABLET | Refills: 0 | Status: SHIPPED | OUTPATIENT
Start: 2019-01-22 | End: 2019-01-23 | Stop reason: SDUPTHER

## 2019-01-22 RX ORDER — ESCITALOPRAM OXALATE 20 MG/1
20 TABLET ORAL DAILY
Qty: 90 TABLET | Refills: 0 | Status: SHIPPED | OUTPATIENT
Start: 2019-01-22 | End: 2019-01-23 | Stop reason: SDUPTHER

## 2019-01-22 RX ORDER — METOPROLOL SUCCINATE 100 MG/1
100 TABLET, EXTENDED RELEASE ORAL DAILY
Qty: 90 TABLET | Refills: 0 | Status: SHIPPED | OUTPATIENT
Start: 2019-01-22 | End: 2019-01-23 | Stop reason: SDUPTHER

## 2019-01-22 NOTE — TELEPHONE ENCOUNTER
Patient would like refill fo Escitalopram 20 mg tab take 1 daily, Metoprolol succinate 100 mg 24 hr tab take 1 daily, Oxybutynin 10 mg 24hr tab take 1 daily and Pantoprazole 40 mg tab take 1 daily  ALL 90 day supply sent to [facility]

## 2019-01-23 DIAGNOSIS — I10 ESSENTIAL HYPERTENSION: ICD-10-CM

## 2019-01-23 DIAGNOSIS — K21.00 ESOPHAGITIS, REFLUX: ICD-10-CM

## 2019-01-23 DIAGNOSIS — F32.A DEPRESSION, UNSPECIFIED DEPRESSION TYPE: ICD-10-CM

## 2019-01-23 DIAGNOSIS — F41.9 ANXIETY: ICD-10-CM

## 2019-01-23 DIAGNOSIS — N39.46 MIXED STRESS AND URGE URINARY INCONTINENCE: ICD-10-CM

## 2019-01-23 RX ORDER — METOPROLOL SUCCINATE 100 MG/1
100 TABLET, EXTENDED RELEASE ORAL DAILY
Qty: 90 TABLET | Refills: 1 | Status: SHIPPED | OUTPATIENT
Start: 2019-01-23 | End: 2019-02-06 | Stop reason: SDUPTHER

## 2019-01-23 RX ORDER — PANTOPRAZOLE SODIUM 40 MG/1
40 TABLET, DELAYED RELEASE ORAL DAILY
Qty: 90 TABLET | Refills: 1 | Status: SHIPPED | OUTPATIENT
Start: 2019-01-23 | End: 2019-02-06 | Stop reason: SDUPTHER

## 2019-01-23 RX ORDER — ESCITALOPRAM OXALATE 20 MG/1
20 TABLET ORAL DAILY
Qty: 90 TABLET | Refills: 1 | Status: SHIPPED | OUTPATIENT
Start: 2019-01-23 | End: 2019-02-06 | Stop reason: SDUPTHER

## 2019-01-23 RX ORDER — OXYBUTYNIN CHLORIDE 10 MG/1
10 TABLET, EXTENDED RELEASE ORAL DAILY
Qty: 90 TABLET | Refills: 1 | Status: SHIPPED | OUTPATIENT
Start: 2019-01-23 | End: 2019-02-06 | Stop reason: SDUPTHER

## 2019-01-23 NOTE — TELEPHONE ENCOUNTER
SARAH called in a script for ESCITALOPRAM 20MG  METOPROLOL 100MG  OXYBUTYNIN 10MG  PANTOPRAZOLE 40MG    ALL FOR 90 DAYS    SHE GAVE THE WRONG PHARMACY IT SHOULD BE    [facility] (NOT [facility])  COULD THIS PLEASE BE CORRECTED)    [phone]  ANY QUESTIONS

## 2019-02-06 ENCOUNTER — OFFICE VISIT (OUTPATIENT)
Dept: FAMILY MEDICINE CLINIC | Facility: CLINIC | Age: 74
End: 2019-02-06
Payer: MEDICARE

## 2019-02-06 VITALS
OXYGEN SATURATION: 96 % | WEIGHT: 211.6 LBS | RESPIRATION RATE: 15 BRPM | DIASTOLIC BLOOD PRESSURE: 70 MMHG | HEIGHT: 66 IN | TEMPERATURE: 96.8 F | HEART RATE: 68 BPM | SYSTOLIC BLOOD PRESSURE: 110 MMHG | BODY MASS INDEX: 34.01 KG/M2

## 2019-02-06 DIAGNOSIS — Z12.31 ENCOUNTER FOR SCREENING MAMMOGRAM FOR BREAST CANCER: ICD-10-CM

## 2019-02-06 DIAGNOSIS — I10 ESSENTIAL HYPERTENSION: Primary | ICD-10-CM

## 2019-02-06 DIAGNOSIS — K21.00 ESOPHAGITIS, REFLUX: ICD-10-CM

## 2019-02-06 DIAGNOSIS — F32.A DEPRESSION, UNSPECIFIED DEPRESSION TYPE: ICD-10-CM

## 2019-02-06 DIAGNOSIS — F41.9 ANXIETY: ICD-10-CM

## 2019-02-06 DIAGNOSIS — N32.81 OVERACTIVE BLADDER: ICD-10-CM

## 2019-02-06 DIAGNOSIS — E78.2 MIXED HYPERLIPIDEMIA: ICD-10-CM

## 2019-02-06 DIAGNOSIS — J45.990 EXERCISE-INDUCED ASTHMA: ICD-10-CM

## 2019-02-06 DIAGNOSIS — Z13.820 SCREENING FOR OSTEOPOROSIS: ICD-10-CM

## 2019-02-06 DIAGNOSIS — Z78.0 POSTMENOPAUSAL: ICD-10-CM

## 2019-02-06 PROCEDURE — 99214 OFFICE O/P EST MOD 30 MIN: CPT | Performed by: FAMILY MEDICINE

## 2019-02-06 RX ORDER — ESCITALOPRAM OXALATE 20 MG/1
20 TABLET ORAL DAILY
Qty: 90 TABLET | Refills: 1 | Status: SHIPPED | OUTPATIENT
Start: 2019-02-06 | End: 2019-12-09 | Stop reason: SDUPTHER

## 2019-02-06 RX ORDER — METOPROLOL SUCCINATE 100 MG/1
100 TABLET, EXTENDED RELEASE ORAL DAILY
Qty: 90 TABLET | Refills: 1 | Status: SHIPPED | OUTPATIENT
Start: 2019-02-06 | End: 2019-12-09 | Stop reason: SDUPTHER

## 2019-02-06 RX ORDER — OXYBUTYNIN CHLORIDE 10 MG/1
10 TABLET, EXTENDED RELEASE ORAL DAILY
Qty: 90 TABLET | Refills: 1 | Status: SHIPPED | OUTPATIENT
Start: 2019-02-06 | End: 2019-12-09 | Stop reason: SDUPTHER

## 2019-02-06 RX ORDER — PANTOPRAZOLE SODIUM 40 MG/1
40 TABLET, DELAYED RELEASE ORAL DAILY
Qty: 90 TABLET | Refills: 1 | Status: SHIPPED | OUTPATIENT
Start: 2019-02-06 | End: 2019-12-09 | Stop reason: SDUPTHER

## 2019-02-06 NOTE — ASSESSMENT & PLAN NOTE
The patient will go for the up-to-date lipid panel as ordered and will continue to work on improving her diet and exercise  We will see her back as scheduled

## 2019-02-06 NOTE — PROGRESS NOTES
Assessment/Plan:  Hypertension  The patient's blood pressure is well controlled on her current medication  We have made no changes today  She will go for the up-to-date lab work as ordered and will follow up with the results  We will see her back as scheduled in 6 months  Hyperlipidemia  The patient will go for the up-to-date lipid panel as ordered and will continue to work on improving her diet and exercise  We will see her back as scheduled  Depression and anxiety  Patient continues to do very well on the escitalopram 20 mg daily  She has had no breakthrough symptoms  We will maintain her on her current dose  We will see her back as scheduled  Diagnoses and all orders for this visit:    Essential hypertension  -     CBC and differential; Future  -     Comprehensive metabolic panel; Future  -     LDL cholesterol, direct; Future  -     Lipid panel; Future  -     TSH, 3rd generation with Free T4 reflex; Future  -     Urinalysis with reflex to microscopic; Future  -     metoprolol succinate (TOPROL-XL) 100 mg 24 hr tablet; Take 1 tablet (100 mg total) by mouth daily    Mixed hyperlipidemia  -     CBC and differential; Future  -     Comprehensive metabolic panel; Future  -     LDL cholesterol, direct; Future  -     Lipid panel; Future  -     TSH, 3rd generation with Free T4 reflex; Future  -     Urinalysis with reflex to microscopic; Future    Depression, unspecified depression type  -     escitalopram (LEXAPRO) 20 mg tablet; Take 1 tablet (20 mg total) by mouth daily    Anxiety  -     escitalopram (LEXAPRO) 20 mg tablet; Take 1 tablet (20 mg total) by mouth daily    Overactive bladder  -     oxybutynin (DITROPAN-XL) 10 MG 24 hr tablet; Take 1 tablet (10 mg total) by mouth daily    Esophagitis, reflux  -     pantoprazole (PROTONIX) 40 mg tablet;  Take 1 tablet (40 mg total) by mouth daily    Exercise-induced asthma    Encounter for screening mammogram for breast cancer  -     Mammo screening bilateral w 3d & cad; Future    Screening for osteoporosis  -     DXA bone density spine hip and pelvis; Future    Postmenopausal  -     DXA bone density spine hip and pelvis; Future       Return in about 6 months (around 8/6/2019) for Recheck  Subjective:   Chief Complaint   Patient presents with    Follow-up     check up visit        Patient ID: Melissa Ibarra is a 68 y o  female presents today for a routine checkup  Melissa Ibarra is a 68 y o  female who presents today for routine checkup of her hypertension, hyperlipidemia, anxiety, depression, GERD, and asthma  She has not been in the office in over a year  She has been feeling well and there are no complaints  The patient denies any chest pain, shortness of breath, or palpitations  There is no edema  There are no headaches or visual changes  There is no lightheadedness, dizziness, or fainting spells  There is occasional dizziness with change in position  The patient currently denies any nausea, vomiting, or GERD symptoms  she has normal bowel movements and normal urine output  she has a normal appetite  She is walking and exercising  She had 2 falls over the past year, but no major injuries  She is not having any depression or anxiety symptoms  There are no suicidal ideations  She is due for blood work  Hypertension   This is a chronic problem  The problem is unchanged  The problem is controlled  Associated symptoms include anxiety  Pertinent negatives include no blurred vision, chest pain, headaches, malaise/fatigue, neck pain, orthopnea, palpitations, peripheral edema, PND, shortness of breath or sweats  Past treatments include beta blockers  The current treatment provides significant improvement  There are no compliance problems  Anxiety   Patient reports no chest pain, palpitations or shortness of breath           The following portions of the patient's history were reviewed and updated as appropriate: allergies, current medications, past family history, past medical history, past social history, past surgical history and problem list   Patient Active Problem List   Diagnosis    History of palpitations    Anxiety    Cataract of left eye    Chronic pain of left knee    Depression    Esophagitis, reflux    Exercise-induced asthma    Ventricular tachycardia (HCC)    Varicose veins of left lower extremity    Sleep apnea    Atrophic vaginitis    Palpitations    Overactive bladder    Osteoarthritis of left knee    Nonalcoholic fatty liver disease    Hypertension    Hyperlipidemia     Past Medical History:   Diagnosis Date    Anxiety     Depression     Hyperlipidemia     Hypertension     Overactive bladder      Past Surgical History:   Procedure Laterality Date    ABDOMINOPLASTY      CATARACT EXTRACTION, BILATERAL      INCONTINENCE SURGERY      REPLACEMENT TOTAL KNEE Right 2007    TUBAL LIGATION      VEIN LIGATION AND STRIPPING       Allergies   Allergen Reactions    Trospium      Other reaction(s): Constipation  [facility] - [id number]: mouth ulcers    Vioxx [Rofecoxib] GI Intolerance     History reviewed  No pertinent family history  Social History     Social History    Marital status: /Civil Union     Spouse name: N/A    Number of children: N/A    Years of education: N/A     Occupational History    Not on file  Social History Main Topics    Smoking status: Never Smoker    Smokeless tobacco: Never Used    Alcohol use Yes      Comment: rarely    Drug use: No    Sexual activity: Not Currently     Other Topics Concern    Not on file     Social History Narrative    No narrative on file     Current Outpatient Prescriptions on File Prior to Visit   Medication Sig Dispense Refill    albuterol (PROAIR HFA) 90 mcg/act inhaler Inhale every 6 (six) hours as needed for shortness of breath      aspirin 81 MG tablet Take 81 mg by mouth daily        Calcium Carb-Cholecalciferol (CALCIUM 1000 + D PO) Take 1 tablet by mouth daily   fluticasone (FLOVENT HFA) 44 mcg/act inhaler Inhale 2 puffs 2 (two) times a day      [DISCONTINUED] escitalopram (LEXAPRO) 20 mg tablet Take 1 tablet (20 mg total) by mouth daily 90 tablet 1    [DISCONTINUED] metoprolol succinate (TOPROL-XL) 100 mg 24 hr tablet Take 1 tablet (100 mg total) by mouth daily 90 tablet 1    [DISCONTINUED] oxybutynin (DITROPAN-XL) 10 MG 24 hr tablet Take 1 tablet (10 mg total) by mouth daily 90 tablet 1    [DISCONTINUED] pantoprazole (PROTONIX) 40 mg tablet Take 1 tablet (40 mg total) by mouth daily 90 tablet 1     No current facility-administered medications on file prior to visit  Review of Systems   Constitutional: Negative  Negative for malaise/fatigue  HENT: Negative  Eyes: Negative  Negative for blurred vision  Respiratory: Negative  Negative for shortness of breath  Cardiovascular: Negative  Negative for chest pain, palpitations, orthopnea and PND  Gastrointestinal: Negative  Endocrine: Negative  Genitourinary: Negative  Musculoskeletal: Negative  Negative for neck pain  Skin: Negative  Allergic/Immunologic: Negative  Neurological: Negative  Negative for headaches  Hematological: Negative  Psychiatric/Behavioral: Negative  Objective:  Vitals:    02/06/19 0758 02/06/19 0829   BP: 110/64 110/70   BP Location: Left arm    Patient Position: Sitting    Cuff Size: Large    Pulse: 55 68   Resp: 15    Temp: (!) 96 8 °F (36 °C)    TempSrc: Tympanic    SpO2: 96%    Weight: 96 kg (211 lb 9 6 oz)    Height: 5' 5 5" (1 664 m)      Body mass index is 34 68 kg/m²    Wt Readings from Last 3 Encounters:   02/06/19 96 kg (211 lb 9 6 oz)   09/19/17 93 kg (205 lb)   06/14/17 91 2 kg (201 lb)     Temp Readings from Last 3 Encounters:   02/06/19 (!) 96 8 °F (36 °C) (Tympanic)   12/19/17 98 °F (36 7 °C)   11/16/16 97 8 °F (36 6 °C)     BP Readings from Last 3 Encounters:   02/06/19 110/70   12/19/17 136/84 09/19/17 130/80     Pulse Readings from Last 3 Encounters:   02/06/19 68   12/19/17 77   09/19/17 64        Physical Exam   Constitutional: She is oriented to person, place, and time  She appears well-developed and well-nourished  HENT:   Head: Normocephalic and atraumatic  Mouth/Throat: No oropharyngeal exudate  Eyes: Pupils are equal, round, and reactive to light  Conjunctivae and EOM are normal    Neck: Normal range of motion  No JVD present  No tracheal deviation present  No thyromegaly present  Cardiovascular: Normal rate, regular rhythm, normal heart sounds and intact distal pulses  Exam reveals no gallop and no friction rub  No murmur heard  Pulmonary/Chest: Effort normal and breath sounds normal  No stridor  No respiratory distress  She has no wheezes  She has no rales  She exhibits no tenderness  Abdominal: Soft  Bowel sounds are normal  She exhibits no distension and no mass  There is no tenderness  There is no rebound and no guarding  Musculoskeletal: Normal range of motion  She exhibits no edema, tenderness or deformity  Lymphadenopathy:     She has no cervical adenopathy  Neurological: She is alert and oriented to person, place, and time  She has normal reflexes  No cranial nerve deficit  She exhibits normal muscle tone  Coordination normal    Skin: Skin is warm and dry

## 2019-02-06 NOTE — ASSESSMENT & PLAN NOTE
The patient's blood pressure is well controlled on her current medication  We have made no changes today  She will go for the up-to-date lab work as ordered and will follow up with the results  We will see her back as scheduled in 6 months

## 2019-02-07 ENCOUNTER — APPOINTMENT (OUTPATIENT)
Dept: LAB | Facility: CLINIC | Age: 74
End: 2019-02-07
Payer: MEDICARE

## 2019-02-07 DIAGNOSIS — I10 ESSENTIAL HYPERTENSION: ICD-10-CM

## 2019-02-07 DIAGNOSIS — E78.2 MIXED HYPERLIPIDEMIA: ICD-10-CM

## 2019-02-07 LAB
ALBUMIN SERPL BCP-MCNC: 3.9 G/DL (ref 3.5–5)
ALP SERPL-CCNC: 78 U/L (ref 46–116)
ALT SERPL W P-5'-P-CCNC: 34 U/L (ref 12–78)
ANION GAP SERPL CALCULATED.3IONS-SCNC: 6 MMOL/L (ref 4–13)
AST SERPL W P-5'-P-CCNC: 27 U/L (ref 5–45)
BACTERIA UR QL AUTO: ABNORMAL /HPF
BASOPHILS # BLD AUTO: 0.04 THOUSANDS/ΜL (ref 0–0.1)
BASOPHILS NFR BLD AUTO: 1 % (ref 0–1)
BILIRUB SERPL-MCNC: 0.56 MG/DL (ref 0.2–1)
BILIRUB UR QL STRIP: NEGATIVE
BUN SERPL-MCNC: 18 MG/DL (ref 5–25)
CALCIUM SERPL-MCNC: 9.2 MG/DL (ref 8.3–10.1)
CHLORIDE SERPL-SCNC: 105 MMOL/L (ref 100–108)
CHOLEST SERPL-MCNC: 178 MG/DL (ref 50–200)
CLARITY UR: CLEAR
CO2 SERPL-SCNC: 29 MMOL/L (ref 21–32)
COLOR UR: YELLOW
CREAT SERPL-MCNC: 0.73 MG/DL (ref 0.6–1.3)
EOSINOPHIL # BLD AUTO: 0.1 THOUSAND/ΜL (ref 0–0.61)
EOSINOPHIL NFR BLD AUTO: 2 % (ref 0–6)
ERYTHROCYTE [DISTWIDTH] IN BLOOD BY AUTOMATED COUNT: 11.8 % (ref 11.6–15.1)
GFR SERPL CREATININE-BSD FRML MDRD: 83 ML/MIN/1.73SQ M
GLUCOSE P FAST SERPL-MCNC: 97 MG/DL (ref 65–99)
GLUCOSE UR STRIP-MCNC: NEGATIVE MG/DL
HCT VFR BLD AUTO: 45.5 % (ref 34.8–46.1)
HDLC SERPL-MCNC: 37 MG/DL (ref 40–60)
HGB BLD-MCNC: 15.2 G/DL (ref 11.5–15.4)
HGB UR QL STRIP.AUTO: NEGATIVE
HYALINE CASTS #/AREA URNS LPF: ABNORMAL /LPF
IMM GRANULOCYTES # BLD AUTO: 0.03 THOUSAND/UL (ref 0–0.2)
IMM GRANULOCYTES NFR BLD AUTO: 1 % (ref 0–2)
KETONES UR STRIP-MCNC: NEGATIVE MG/DL
LDLC SERPL CALC-MCNC: 97 MG/DL (ref 0–100)
LDLC SERPL DIRECT ASSAY-MCNC: 117 MG/DL (ref 0–100)
LEUKOCYTE ESTERASE UR QL STRIP: ABNORMAL
LYMPHOCYTES # BLD AUTO: 1.18 THOUSANDS/ΜL (ref 0.6–4.47)
LYMPHOCYTES NFR BLD AUTO: 23 % (ref 14–44)
MCH RBC QN AUTO: 30.2 PG (ref 26.8–34.3)
MCHC RBC AUTO-ENTMCNC: 33.4 G/DL (ref 31.4–37.4)
MCV RBC AUTO: 90 FL (ref 82–98)
MONOCYTES # BLD AUTO: 0.33 THOUSAND/ΜL (ref 0.17–1.22)
MONOCYTES NFR BLD AUTO: 7 % (ref 4–12)
NEUTROPHILS # BLD AUTO: 3.39 THOUSANDS/ΜL (ref 1.85–7.62)
NEUTS SEG NFR BLD AUTO: 66 % (ref 43–75)
NITRITE UR QL STRIP: NEGATIVE
NON-SQ EPI CELLS URNS QL MICRO: ABNORMAL /HPF
NONHDLC SERPL-MCNC: 141 MG/DL
NRBC BLD AUTO-RTO: 0 /100 WBCS
PH UR STRIP.AUTO: 7.5 [PH] (ref 4.5–8)
PLATELET # BLD AUTO: 160 THOUSANDS/UL (ref 149–390)
PMV BLD AUTO: 11.1 FL (ref 8.9–12.7)
POTASSIUM SERPL-SCNC: 4.4 MMOL/L (ref 3.5–5.3)
PROT SERPL-MCNC: 7.5 G/DL (ref 6.4–8.2)
PROT UR STRIP-MCNC: NEGATIVE MG/DL
RBC # BLD AUTO: 5.04 MILLION/UL (ref 3.81–5.12)
RBC #/AREA URNS AUTO: ABNORMAL /HPF
SODIUM SERPL-SCNC: 140 MMOL/L (ref 136–145)
SP GR UR STRIP.AUTO: 1.01 (ref 1–1.03)
TRIGL SERPL-MCNC: 218 MG/DL
TSH SERPL DL<=0.05 MIU/L-ACNC: 2.52 UIU/ML (ref 0.36–3.74)
UROBILINOGEN UR QL STRIP.AUTO: 0.2 E.U./DL
WBC # BLD AUTO: 5.07 THOUSAND/UL (ref 4.31–10.16)
WBC #/AREA URNS AUTO: ABNORMAL /HPF

## 2019-02-07 PROCEDURE — 80061 LIPID PANEL: CPT

## 2019-02-07 PROCEDURE — 81001 URINALYSIS AUTO W/SCOPE: CPT

## 2019-02-07 PROCEDURE — 84443 ASSAY THYROID STIM HORMONE: CPT

## 2019-02-07 PROCEDURE — 80053 COMPREHEN METABOLIC PANEL: CPT

## 2019-02-07 PROCEDURE — 36415 COLL VENOUS BLD VENIPUNCTURE: CPT

## 2019-02-07 PROCEDURE — 85025 COMPLETE CBC W/AUTO DIFF WBC: CPT

## 2019-02-07 PROCEDURE — 83721 ASSAY OF BLOOD LIPOPROTEIN: CPT

## 2019-02-11 ENCOUNTER — TELEPHONE (OUTPATIENT)
Dept: FAMILY MEDICINE CLINIC | Facility: CLINIC | Age: 74
End: 2019-02-11

## 2019-06-10 ENCOUNTER — HOSPITAL ENCOUNTER (OUTPATIENT)
Dept: MAMMOGRAPHY | Facility: CLINIC | Age: 74
Discharge: HOME | End: 2019-06-10
Payer: MEDICARE

## 2019-06-10 VITALS — HEIGHT: 66 IN | BODY MASS INDEX: 33.91 KG/M2 | WEIGHT: 211 LBS

## 2019-06-10 DIAGNOSIS — Z13.820 SCREENING FOR OSTEOPOROSIS: ICD-10-CM

## 2019-06-10 DIAGNOSIS — Z78.0 POSTMENOPAUSAL: ICD-10-CM

## 2019-06-10 DIAGNOSIS — Z12.31 ENCOUNTER FOR SCREENING MAMMOGRAM FOR BREAST CANCER: ICD-10-CM

## 2019-06-10 PROCEDURE — 77063 BREAST TOMOSYNTHESIS BI: CPT

## 2019-06-10 PROCEDURE — 77080 DXA BONE DENSITY AXIAL: CPT

## 2019-06-10 PROCEDURE — 77067 SCR MAMMO BI INCL CAD: CPT

## 2019-11-22 ENCOUNTER — TELEPHONE (OUTPATIENT)
Dept: FAMILY MEDICINE CLINIC | Facility: CLINIC | Age: 74
End: 2019-11-22

## 2019-12-09 ENCOUNTER — TELEPHONE (OUTPATIENT)
Dept: FAMILY MEDICINE CLINIC | Facility: CLINIC | Age: 74
End: 2019-12-09

## 2019-12-09 DIAGNOSIS — N32.81 OVERACTIVE BLADDER: ICD-10-CM

## 2019-12-09 DIAGNOSIS — F41.9 ANXIETY: ICD-10-CM

## 2019-12-09 DIAGNOSIS — I10 ESSENTIAL HYPERTENSION: ICD-10-CM

## 2019-12-09 DIAGNOSIS — F32.A DEPRESSION, UNSPECIFIED DEPRESSION TYPE: ICD-10-CM

## 2019-12-09 DIAGNOSIS — K21.00 ESOPHAGITIS, REFLUX: ICD-10-CM

## 2019-12-09 RX ORDER — METOPROLOL SUCCINATE 100 MG/1
100 TABLET, EXTENDED RELEASE ORAL DAILY
Qty: 90 TABLET | Refills: 1 | Status: SHIPPED | OUTPATIENT
Start: 2019-12-09 | End: 2020-03-03 | Stop reason: SDUPTHER

## 2019-12-09 RX ORDER — OXYBUTYNIN CHLORIDE 10 MG/1
10 TABLET, EXTENDED RELEASE ORAL DAILY
Qty: 90 TABLET | Refills: 1 | Status: SHIPPED | OUTPATIENT
Start: 2019-12-09 | End: 2020-03-03 | Stop reason: SDUPTHER

## 2019-12-09 RX ORDER — PANTOPRAZOLE SODIUM 40 MG/1
40 TABLET, DELAYED RELEASE ORAL DAILY
Qty: 90 TABLET | Refills: 1 | Status: SHIPPED | OUTPATIENT
Start: 2019-12-09 | End: 2020-03-03 | Stop reason: SDUPTHER

## 2019-12-09 RX ORDER — ESCITALOPRAM OXALATE 20 MG/1
20 TABLET ORAL DAILY
Qty: 90 TABLET | Refills: 1 | Status: SHIPPED | OUTPATIENT
Start: 2019-12-09 | End: 2020-03-03 | Stop reason: SDUPTHER

## 2019-12-09 NOTE — TELEPHONE ENCOUNTER
escitalopram (LEXAPRO) 20 mg tablet [[id number]    metoprolol succinate (TOPROL-XL) 100 mg 24 hr tablet [[id number]    oxybutynin (DITROPAN-XL) 10 MG 24 hr tablet [[id number]    pantoprazole (PROTONIX) 40 mg tablet [[id number]    Patient called, she needs Rx sent into [facility]  Pt gets a 90 day supply  Thank you

## 2020-01-23 ENCOUNTER — APPOINTMENT (OUTPATIENT)
Dept: LAB | Facility: CLINIC | Age: 75
End: 2020-01-23
Payer: MEDICARE

## 2020-01-23 ENCOUNTER — OFFICE VISIT (OUTPATIENT)
Dept: FAMILY MEDICINE CLINIC | Facility: CLINIC | Age: 75
End: 2020-01-23
Payer: MEDICARE

## 2020-01-23 VITALS
TEMPERATURE: 99 F | HEIGHT: 66 IN | DIASTOLIC BLOOD PRESSURE: 76 MMHG | OXYGEN SATURATION: 97 % | SYSTOLIC BLOOD PRESSURE: 120 MMHG | HEART RATE: 55 BPM | WEIGHT: 210 LBS | BODY MASS INDEX: 33.75 KG/M2

## 2020-01-23 DIAGNOSIS — E78.2 MIXED HYPERLIPIDEMIA: ICD-10-CM

## 2020-01-23 DIAGNOSIS — Z00.00 MEDICARE ANNUAL WELLNESS VISIT, SUBSEQUENT: Primary | ICD-10-CM

## 2020-01-23 DIAGNOSIS — F32.A DEPRESSION, UNSPECIFIED DEPRESSION TYPE: ICD-10-CM

## 2020-01-23 DIAGNOSIS — F41.9 ANXIETY: ICD-10-CM

## 2020-01-23 DIAGNOSIS — I10 ESSENTIAL HYPERTENSION: ICD-10-CM

## 2020-01-23 DIAGNOSIS — Z23 NEED FOR VACCINATION: ICD-10-CM

## 2020-01-23 LAB
ALBUMIN SERPL BCP-MCNC: 3.9 G/DL (ref 3.5–5)
ALP SERPL-CCNC: 77 U/L (ref 46–116)
ALT SERPL W P-5'-P-CCNC: 49 U/L (ref 12–78)
ANION GAP SERPL CALCULATED.3IONS-SCNC: 6 MMOL/L (ref 4–13)
AST SERPL W P-5'-P-CCNC: 32 U/L (ref 5–45)
BASOPHILS # BLD AUTO: 0.05 THOUSANDS/ΜL (ref 0–0.1)
BASOPHILS NFR BLD AUTO: 1 % (ref 0–1)
BILIRUB SERPL-MCNC: 0.61 MG/DL (ref 0.2–1)
BUN SERPL-MCNC: 17 MG/DL (ref 5–25)
CALCIUM SERPL-MCNC: 9.9 MG/DL (ref 8.3–10.1)
CHLORIDE SERPL-SCNC: 105 MMOL/L (ref 100–108)
CHOLEST SERPL-MCNC: 175 MG/DL (ref 50–200)
CO2 SERPL-SCNC: 30 MMOL/L (ref 21–32)
CREAT SERPL-MCNC: 0.76 MG/DL (ref 0.6–1.3)
EOSINOPHIL # BLD AUTO: 0.11 THOUSAND/ΜL (ref 0–0.61)
EOSINOPHIL NFR BLD AUTO: 2 % (ref 0–6)
ERYTHROCYTE [DISTWIDTH] IN BLOOD BY AUTOMATED COUNT: 11.9 % (ref 11.6–15.1)
GFR SERPL CREATININE-BSD FRML MDRD: 78 ML/MIN/1.73SQ M
GLUCOSE P FAST SERPL-MCNC: 93 MG/DL (ref 65–99)
HCT VFR BLD AUTO: 47.8 % (ref 34.8–46.1)
HDLC SERPL-MCNC: 35 MG/DL
HGB BLD-MCNC: 15.6 G/DL (ref 11.5–15.4)
IMM GRANULOCYTES # BLD AUTO: 0.02 THOUSAND/UL (ref 0–0.2)
IMM GRANULOCYTES NFR BLD AUTO: 0 % (ref 0–2)
LDLC SERPL CALC-MCNC: 93 MG/DL (ref 0–100)
LDLC SERPL DIRECT ASSAY-MCNC: 108 MG/DL (ref 0–100)
LYMPHOCYTES # BLD AUTO: 1.43 THOUSANDS/ΜL (ref 0.6–4.47)
LYMPHOCYTES NFR BLD AUTO: 25 % (ref 14–44)
MCH RBC QN AUTO: 29.5 PG (ref 26.8–34.3)
MCHC RBC AUTO-ENTMCNC: 32.6 G/DL (ref 31.4–37.4)
MCV RBC AUTO: 91 FL (ref 82–98)
MONOCYTES # BLD AUTO: 0.32 THOUSAND/ΜL (ref 0.17–1.22)
MONOCYTES NFR BLD AUTO: 6 % (ref 4–12)
NEUTROPHILS # BLD AUTO: 3.89 THOUSANDS/ΜL (ref 1.85–7.62)
NEUTS SEG NFR BLD AUTO: 66 % (ref 43–75)
NONHDLC SERPL-MCNC: 140 MG/DL
NRBC BLD AUTO-RTO: 0 /100 WBCS
PLATELET # BLD AUTO: 182 THOUSANDS/UL (ref 149–390)
PMV BLD AUTO: 11 FL (ref 8.9–12.7)
POTASSIUM SERPL-SCNC: 4.1 MMOL/L (ref 3.5–5.3)
PROT SERPL-MCNC: 7.9 G/DL (ref 6.4–8.2)
RBC # BLD AUTO: 5.28 MILLION/UL (ref 3.81–5.12)
SODIUM SERPL-SCNC: 141 MMOL/L (ref 136–145)
T4 FREE SERPL-MCNC: 0.89 NG/DL (ref 0.76–1.46)
TRIGL SERPL-MCNC: 234 MG/DL
TSH SERPL DL<=0.05 MIU/L-ACNC: 2.47 UIU/ML (ref 0.36–3.74)
WBC # BLD AUTO: 5.82 THOUSAND/UL (ref 4.31–10.16)

## 2020-01-23 PROCEDURE — G0439 PPPS, SUBSEQ VISIT: HCPCS | Performed by: FAMILY MEDICINE

## 2020-01-23 PROCEDURE — 83721 ASSAY OF BLOOD LIPOPROTEIN: CPT

## 2020-01-23 PROCEDURE — 80053 COMPREHEN METABOLIC PANEL: CPT

## 2020-01-23 PROCEDURE — 1123F ACP DISCUSS/DSCN MKR DOCD: CPT | Performed by: FAMILY MEDICINE

## 2020-01-23 PROCEDURE — 99214 OFFICE O/P EST MOD 30 MIN: CPT | Performed by: FAMILY MEDICINE

## 2020-01-23 PROCEDURE — 85025 COMPLETE CBC W/AUTO DIFF WBC: CPT

## 2020-01-23 PROCEDURE — 84443 ASSAY THYROID STIM HORMONE: CPT

## 2020-01-23 PROCEDURE — 84439 ASSAY OF FREE THYROXINE: CPT

## 2020-01-23 PROCEDURE — 36415 COLL VENOUS BLD VENIPUNCTURE: CPT

## 2020-01-23 PROCEDURE — 80061 LIPID PANEL: CPT

## 2020-01-23 NOTE — PROGRESS NOTES
Assessment/Plan:  Hypertension  The patient's blood pressure is well controlled with her current medication  We have made no changes today  She will go for the lab work as ordered and we scheduled  will follow up with the results  We will see her back as    Hyperlipidemia  The patient will continue to work on improving her diet and exercise  She will go for the up-to-date lipid panel as ordered  Depression  The patient is stable on her current dose of the escitalopram   We have made no changes today  Anxiety  The patient is stable on her current dose of the escitalopram   We have made no changes today  Diagnoses and all orders for this visit:    Medicare annual wellness visit, subsequent    Essential hypertension  -     CBC and differential; Future  -     Comprehensive metabolic panel; Future  -     LDL cholesterol, direct; Future  -     T4, free; Future  -     TSH, 3rd generation; Future  -     Lipid panel; Future    Mixed hyperlipidemia  -     Comprehensive metabolic panel; Future  -     LDL cholesterol, direct; Future  -     Lipid panel; Future    Depression, unspecified depression type  -     CBC and differential; Future  -     Comprehensive metabolic panel; Future  -     LDL cholesterol, direct; Future  -     T4, free; Future  -     TSH, 3rd generation; Future  -     Lipid panel; Future    Anxiety  -     CBC and differential; Future  -     Comprehensive metabolic panel; Future  -     LDL cholesterol, direct; Future  -     T4, free; Future  -     TSH, 3rd generation; Future  -     Lipid panel; Future    Need for vaccination  -     Zoster Vac Recomb Adjuvanted ([address] [place] [address]) 50 MCG/0 5ML SUSR; Inject 1 Dose into a muscle once for 1 dose       Return in about 1 year (around 1/23/2021) for Recheck  Subjective:   Chief Complaint   Patient presents with    Follow-up     Check up Hypertension        Patient ID: Rashad Jansen is a 69 y o  female presents today for a routine checkup    Elizabeth Raya Deangelo Harding is a 69 y o  Female who presents today for follow-up of her hypertension, hyperlipidemia, depression, and anxiety  She is current with her colonoscopy and we will update her health maintenance chart  She has been feeling well  The patient denies any chest pain, shortness of breath, or palpitations  There is no edema  There are no headaches or visual changes  There is no lightheadedness, dizziness, or fainting spells  The patient currently denies any nausea, vomiting, or GERD symptoms  she has normal bowel movements and normal urine output  she has a normal appetite  She joined [employer] again and is going to work on trying to lose some weight  She feels great and has no complaints today  Hypertension   This is a chronic problem  The current episode started more than 1 year ago  The problem is unchanged  The problem is controlled  Pertinent negatives include no anxiety, blurred vision, chest pain, headaches, malaise/fatigue, neck pain, orthopnea, palpitations, peripheral edema, PND, shortness of breath or sweats  Past treatments include beta blockers  The current treatment provides significant improvement  There are no compliance problems        The following portions of the patient's history were reviewed and updated as appropriate: allergies, current medications, past family history, past medical history, past social history, past surgical history and problem list   Patient Active Problem List   Diagnosis    History of palpitations    Anxiety    Cataract of left eye    Chronic pain of left knee    Depression    Esophagitis, reflux    Exercise-induced asthma    Ventricular tachycardia ([address] )    Varicose veins of left lower extremity    Sleep apnea    Atrophic vaginitis    Palpitations    Overactive bladder    Osteoarthritis of left knee    Nonalcoholic fatty liver disease    Hypertension    Hyperlipidemia     Past Medical History:   Diagnosis Date    Anxiety     Depression     Hyperlipidemia     Hypertension     Overactive bladder      Past Surgical History:   Procedure Laterality Date    ABDOMINOPLASTY      BREAST BIOPSY Right 1962    excisional biopsy-benign    CATARACT EXTRACTION, BILATERAL      INCONTINENCE SURGERY      REPLACEMENT TOTAL KNEE Right 2007    TUBAL LIGATION      VEIN LIGATION AND STRIPPING       Allergies   Allergen Reactions    Trospium      Other reaction(s): Constipation  [facility] - [id number]: mouth ulcers    Vioxx [Rofecoxib] GI Intolerance     Family History   Problem Relation Age of Onset    No Known Problems Sister     No Known Problems Daughter     No Known Problems Sister     No Known Problems Sister     No Known Problems Maternal Aunt      Social History     Socioeconomic History    Marital status: /Civil Union     Spouse name: Not on file    Number of children: Not on file    Years of education: Not on file    Highest education level: Not on file   Occupational History    Not on file   Social Needs    Financial resource strain: Not on file    Food insecurity:     Worry: Not on file     Inability: Not on file    Transportation needs:     Medical: Not on file     Non-medical: Not on file   Tobacco Use    Smoking status: Never Smoker    Smokeless tobacco: Never Used   Substance and Sexual Activity    Alcohol use: Yes     Comment: rarely    Drug use: No    Sexual activity: Not Currently   Lifestyle    Physical activity:     Days per week: Not on file     Minutes per session: Not on file    Stress: Not on file   Relationships    Social connections:     Talks on phone: Not on file     Gets together: Not on file     Attends [employer] service: Not on file     Active member of club or organization: Not on file     Attends meetings of clubs or organizations: Not on file     Relationship status: Not on file    Intimate partner violence:     Fear of current or ex partner: Not on file     Emotionally abused: Not on file     Physically abused: Not on file     Forced sexual activity: Not on file   Other Topics Concern    Not on file   Social History Narrative    Not on file     Current Outpatient Medications on File Prior to Visit   Medication Sig Dispense Refill    Calcium Carb-Cholecalciferol (CALCIUM 1000 + D PO) Take 1 tablet by mouth daily   escitalopram (LEXAPRO) 20 mg tablet Take 1 tablet (20 mg total) by mouth daily 90 tablet 1    metoprolol succinate (TOPROL-XL) 100 mg 24 hr tablet Take 1 tablet (100 mg total) by mouth daily 90 tablet 1    oxybutynin (DITROPAN-XL) 10 MG 24 hr tablet Take 1 tablet (10 mg total) by mouth daily 90 tablet 1    pantoprazole (PROTONIX) 40 mg tablet Take 1 tablet (40 mg total) by mouth daily 90 tablet 1    [DISCONTINUED] albuterol (PROAIR HFA) 90 mcg/act inhaler Inhale every 6 (six) hours as needed for shortness of breath      [DISCONTINUED] aspirin 81 MG tablet Take 81 mg by mouth daily   [DISCONTINUED] fluticasone (FLOVENT HFA) 44 mcg/act inhaler Inhale 2 puffs 2 (two) times a day       No current facility-administered medications on file prior to visit  Review of Systems   Constitutional: Negative  Negative for malaise/fatigue  HENT: Negative  Eyes: Negative  Negative for blurred vision  Respiratory: Negative  Negative for shortness of breath  Cardiovascular: Negative  Negative for chest pain, palpitations, orthopnea and PND  Gastrointestinal: Negative  Endocrine: Negative  Genitourinary: Negative  Musculoskeletal: Negative  Negative for neck pain  Skin: Negative  Allergic/Immunologic: Negative  Neurological: Negative  Negative for headaches  Hematological: Negative  Psychiatric/Behavioral: Negative          Objective:  Vitals:    01/23/20 0842   BP: 120/76   BP Location: Left arm   Patient Position: Sitting   Cuff Size: Large   Pulse: 55   Temp: 99 °F (37 2 °C)   TempSrc: Tympanic   SpO2: 97%   Weight: 95 3 kg (210 lb)   Height: 5' 5 5" (1 664 m)     Body mass index is 34 41 kg/m²  Physical Exam   Constitutional: She is oriented to person, place, and time  She appears well-developed and well-nourished  HENT:   Head: Normocephalic and atraumatic  Mouth/Throat: No oropharyngeal exudate  Eyes: Pupils are equal, round, and reactive to light  Conjunctivae and EOM are normal    Neck: Normal range of motion  No JVD present  No tracheal deviation present  No thyromegaly present  Cardiovascular: Normal rate, regular rhythm, normal heart sounds and intact distal pulses  Exam reveals no gallop and no friction rub  No murmur heard  Pulmonary/Chest: Effort normal and breath sounds normal  No stridor  No respiratory distress  She has no wheezes  She has no rales  She exhibits no tenderness  Abdominal: Soft  Bowel sounds are normal  She exhibits no distension and no mass  There is no tenderness  There is no rebound and no guarding  Musculoskeletal: Normal range of motion  She exhibits no edema, tenderness or deformity  Lymphadenopathy:     She has no cervical adenopathy  Neurological: She is alert and oriented to person, place, and time  She has normal reflexes  She displays normal reflexes  No cranial nerve deficit  She exhibits normal muscle tone  Coordination normal    Skin: Skin is warm and dry  BMI Counseling: Body mass index is 34 41 kg/m²  The BMI is above normal  Nutrition recommendations include decreasing portion sizes, encouraging healthy choices of fruits and vegetables, decreasing fast food intake, consuming healthier snacks, limiting drinks that contain sugar, moderation in carbohydrate intake, increasing intake of lean protein, reducing intake of saturated and trans fat and reducing intake of cholesterol  Exercise recommendations include exercising 3-5 times per week and obtaining a gym membership  No pharmacotherapy was ordered  Patient referred to PCP due to patient being overweight

## 2020-01-23 NOTE — ASSESSMENT & PLAN NOTE
The patient will continue to work on improving her diet and exercise  She will go for the up-to-date lipid panel as ordered

## 2020-01-23 NOTE — PATIENT INSTRUCTIONS
Medicare Preventive Visit Patient Instructions  Thank you for completing your Welcome to Medicare Visit or Medicare Annual Wellness Visit today  Your next wellness visit will be due in one year (1/23/2021)  The screening/preventive services that you may require over the next 5-10 years are detailed below  Some tests may not apply to you based off risk factors and/or age  Screening tests ordered at today's visit but not completed yet may show as past due  Also, please note that scanned in results may not display below  Preventive Screenings:  Service Recommendations Previous Testing/Comments   Colorectal Cancer Screening  * Colonoscopy    * Fecal Occult Blood Test (FOBT)/Fecal Immunochemical Test (FIT)  * Fecal DNA/Cologuard Test  * Flexible Sigmoidoscopy Age: 54-65 years old   Colonoscopy: every 10 years (may be performed more frequently if at higher risk)  OR  FOBT/FIT: every 1 year  OR  Cologuard: every 3 years  OR  Sigmoidoscopy: every 5 years  Screening may be recommended earlier than age 48 if at higher risk for colorectal cancer  Also, an individualized decision between you and your healthcare provider will decide whether screening between the ages of 74-80 would be appropriate  Colonoscopy: 10/23/2012  FOBT/FIT: Not on file  Cologuard: Not on file  Sigmoidoscopy: Not on file         Breast Cancer Screening Age: 36 years old  Frequency: every 1-2 years  Not required if history of left and right mastectomy Mammogram: 06/10/2019       Cervical Cancer Screening Between the ages of 21-29, pap smear recommended once every 3 years  Between the ages of 33-67, can perform pap smear with HPV co-testing every 5 years     Recommendations may differ for women with a history of total hysterectomy, cervical cancer, or abnormal pap smears in past  Pap Smear: Not on file       Hepatitis C Screening Once for adults born between [facility]  More frequently in patients at high risk for Hepatitis C Hep C Antibody: 08/02/2016       Diabetes Screening 1-2 times per year if you're at risk for diabetes or have pre-diabetes Fasting glucose: 97 mg/dL   A1C: No results in last 5 years       Cholesterol Screening Once every 5 years if you don't have a lipid disorder  May order more often based on risk factors  Lipid panel: 02/07/2019         Other Preventive Screenings Covered by Medicare:  1  Abdominal Aortic Aneurysm (AAA) Screening: covered once if your at risk  You're considered to be at risk if you have a family history of AAA  2  Lung Cancer Screening: covers low dose CT scan once per year if you meet all of the following conditions: (1) Age 50-69; (2) No signs or symptoms of lung cancer; (3) Current smoker or have quit smoking within the last 15 years; (4) You have a tobacco smoking history of at least 30 pack years (packs per day multiplied by number of years you smoked); (5) You get a written order from a healthcare provider  3  Glaucoma Screening: covered annually if you're considered high risk: (1) You have diabetes OR (2) Family history of glaucoma OR (3)  aged 48 and older OR (3)  American aged 72 and older  3  Osteoporosis Screening: covered every 2 years if you meet one of the following conditions: (1) You're estrogen deficient and at risk for osteoporosis based off medical history and other findings; (2) Have a vertebral abnormality; (3) On glucocorticoid therapy for more than 3 months; (4) Have primary hyperparathyroidism; (5) On osteoporosis medications and need to assess response to drug therapy  · Last bone density test (DXA Scan): 06/10/2019   5  HIV Screening: covered annually if you're between the age of 15-65  Also covered annually if you are younger than 13 and older than 72 with risk factors for HIV infection  For pregnant patients, it is covered up to 3 times per pregnancy      Immunizations:  Immunization Recommendations   Influenza Vaccine Annual influenza vaccination during flu season is recommended for all persons aged >= 6 months who do not have contraindications   Pneumococcal Vaccine (Prevnar and Pneumovax)  * Prevnar = PCV13  * Pneumovax = PPSV23   Adults 25-60 years old: 1-3 doses may be recommended based on certain risk factors  Adults 72 years old: Prevnar (PCV13) vaccine recommended followed by Pneumovax (PPSV23) vaccine  If already received PPSV23 since turning 65, then PCV13 recommended at least one year after PPSV23 dose  Hepatitis B Vaccine 3 dose series if at intermediate or high risk (ex: diabetes, end stage renal disease, liver disease)   Tetanus (Td) Vaccine - COST NOT COVERED BY MEDICARE PART B Following completion of primary series, a booster dose should be given every 10 years to maintain immunity against tetanus  Td may also be given as tetanus wound prophylaxis  Tdap Vaccine - COST NOT COVERED BY MEDICARE PART B Recommended at least once for all adults  For pregnant patients, recommended with each pregnancy  Shingles Vaccine (Shingrix) - COST NOT COVERED BY MEDICARE PART B  2 shot series recommended in those aged 48 and above     Health Maintenance Due:      Topic Date Due    CRC Screening: Colonoscopy  10/23/2017    MAMMOGRAM  06/10/2020    Hepatitis C Screening  Completed     Immunizations Due:      Topic Date Due    DTaP,Tdap,and Td Vaccines (1 - Tdap) 11/26/1956     Advance Directives   What are advance directives? Advance directives are legal documents that state your wishes and plans for medical care  These plans are made ahead of time in case you lose your ability to make decisions for yourself  Advance directives can apply to any medical decision, such as the treatments you want, and if you want to donate organs  What are the types of advance directives? There are many types of advance directives, and each state has rules about how to use them  You may choose a combination of any of the following:  · Living will:   This is a written record of the treatment you want  You can also choose which treatments you do not want, which to limit, and which to stop at a certain time  This includes surgery, medicine, IV fluid, and tube feedings  · Durable power of  for healthcare [facility]): This is a written record that states who you want to make healthcare choices for you when you are unable to make them for yourself  This person, called a proxy, is usually a family member or a friend  You may choose more than 1 proxy  · Do not resuscitate (DNR) order:  A DNR order is used in case your heart stops beating or you stop breathing  It is a request not to have certain forms of treatment, such as CPR  A DNR order may be included in other types of advance directives  · Medical directive: This covers the care that you want if you are in a coma, near death, or unable to make decisions for yourself  You can list the treatments you want for each condition  Treatment may include pain medicine, surgery, blood transfusions, dialysis, IV or tube feedings, and a ventilator (breathing machine)  · Values history: This document has questions about your views, beliefs, and how you feel and think about life  This information can help others choose the care that you would choose  Why are advance directives important? An advance directive helps you control your care  Although spoken wishes may be used, it is better to have your wishes written down  Spoken wishes can be misunderstood, or not followed  Treatments may be given even if you do not want them  An advance directive may make it easier for your family to make difficult choices about your care  Urinary Incontinence   Urinary incontinence (UI)  is when you lose control of your bladder  UI develops because your bladder cannot store or empty urine properly  The 3 most common types of UI are stress incontinence, urge incontinence, or both  Medicines:   · May be given to help strengthen your bladder control   Report any side effects of medication to your healthcare provider  Do pelvic muscle exercises often:  Your pelvic muscles help you stop urinating  Squeeze these muscles tight for 5 seconds, then relax for 5 seconds  Gradually work up to squeezing for 10 seconds  Do 3 sets of 15 repetitions a day, or as directed  This will help strengthen your pelvic muscles and improve bladder control  Train your bladder:  Go to the bathroom at set times, such as every 2 hours, even if you do not feel the urge to go  You can also try to hold your urine when you feel the urge to go  For example, hold your urine for 5 minutes when you feel the urge to go  As that becomes easier, hold your urine for 10 minutes  Self-care:   · Keep a UI record  Write down how often you leak urine and how much you leak  Make a note of what you were doing when you leaked urine  · Drink liquids as directed  You may need to limit the amount of liquid you drink to help control your urine leakage  Do not drink any liquid right before you go to bed  Limit or do not have drinks that contain caffeine or alcohol  · Prevent constipation  Eat a variety of high-fiber foods  Good examples are high-fiber cereals, beans, vegetables, and whole-grain breads  Walking is the best way to trigger your intestines to have a bowel movement  · Exercise regularly and maintain a healthy weight  Weight loss and exercise will decrease pressure on your bladder and help you control your leakage  · Use a catheter as directed  to help empty your bladder  A catheter is a tiny, plastic tube that is put into your bladder to drain your urine  · Go to behavior therapy as directed  Behavior therapy may be used to help you learn to control your urge to urinate  Weight Management   Why it is important to manage your weight:  Being overweight increases your risk of health conditions such as heart disease, high blood pressure, type 2 diabetes, and certain types of cancer   It can also increase your risk for osteoarthritis, sleep apnea, and other respiratory problems  Aim for a slow, steady weight loss  Even a small amount of weight loss can lower your risk of health problems  How to lose weight safely:  A safe and healthy way to lose weight is to eat fewer calories and get regular exercise  You can lose up about 1 pound a week by decreasing the number of calories you eat by 500 calories each day  Healthy meal plan for weight management:  A healthy meal plan includes a variety of foods, contains fewer calories, and helps you stay healthy  A healthy meal plan includes the following:  · Eat whole-grain foods more often  A healthy meal plan should contain fiber  Fiber is the part of grains, fruits, and vegetables that is not broken down by your body  Whole-grain foods are healthy and provide extra fiber in your diet  Some examples of whole-grain foods are whole-wheat breads and pastas, oatmeal, brown rice, and bulgur  · Eat a variety of vegetables every day  Include dark, leafy greens such as spinach, kale, mari greens, and mustard greens  Eat yellow and orange vegetables such as carrots, sweet potatoes, and winter squash  · Eat a variety of fruits every day  Choose fresh or canned fruit (canned in its own juice or light syrup) instead of juice  Fruit juice has very little or no fiber  · Eat low-fat dairy foods  Drink fat-free (skim) milk or 1% milk  Eat fat-free yogurt and low-fat cottage cheese  Try low-fat cheeses such as mozzarella and other reduced-fat cheeses  · Choose meat and other protein foods that are low in fat  Choose beans or other legumes such as split peas or lentils  Choose fish, skinless poultry (chicken or turkey), or lean cuts of red meat (beef or pork)  Before you cook meat or poultry, cut off any visible fat  · Use less fat and oil  Try baking foods instead of frying them   Add less fat, such as margarine, sour cream, regular salad dressing and mayonnaise to foods  Eat fewer high-fat foods  Some examples of high-fat foods include french fries, doughnuts, ice cream, and cakes  · Eat fewer sweets  Limit foods and drinks that are high in sugar  This includes candy, cookies, regular soda, and sweetened drinks  Exercise:  Exercise at least 30 minutes per day on most days of the week  Some examples of exercise include walking, biking, dancing, and swimming  You can also fit in more physical activity by taking the stairs instead of the elevator or parking farther away from stores  Ask your healthcare provider about the best exercise plan for you  © Copyright [employer] 2018 Information is for End User's use only and may not be sold, redistributed or otherwise used for commercial purposes   All illustrations and images included in CareNotes® are the copyrighted property of A D A M , Inc  or [address] [place], [place] [address]

## 2020-01-23 NOTE — ASSESSMENT & PLAN NOTE
The patient's blood pressure is well controlled with her current medication  We have made no changes today  She will go for the lab work as ordered and we scheduled  will follow up with the results    We will see her back as

## 2020-01-23 NOTE — PROGRESS NOTES
Assessment and Plan:     Problem List Items Addressed This Visit        Cardiovascular and Mediastinum    Hypertension    Relevant Orders    CBC and differential (Completed)    Comprehensive metabolic panel    LDL cholesterol, direct    T4, free    TSH, 3rd generation    Lipid panel       Other    Anxiety    Relevant Orders    CBC and differential (Completed)    Comprehensive metabolic panel    LDL cholesterol, direct    T4, free    TSH, 3rd generation    Lipid panel    Depression    Relevant Orders    CBC and differential (Completed)    Comprehensive metabolic panel    LDL cholesterol, direct    T4, free    TSH, 3rd generation    Lipid panel    Hyperlipidemia    Relevant Orders    Comprehensive metabolic panel    LDL cholesterol, direct    Lipid panel      Other Visit Diagnoses     Medicare annual wellness visit, subsequent    -  Primary    Need for vaccination        Relevant Medications    Zoster Vac Recomb Adjuvanted (SHINGRIX) 50 MCG/0 5ML SUSR      The patient had a normal exam today in the office  She will go for the testing as ordered  Please see the accompanying progress note  She is going to work on improving her diet and exercise and on losing weight  Falls Plan of Care: Balance, strength, and gait training instructions were provided  BMI Counseling: Body mass index is 34 41 kg/m²  The BMI is above normal  Nutrition recommendations include decreasing portion sizes, encouraging healthy choices of fruits and vegetables, decreasing fast food intake, consuming healthier snacks, limiting drinks that contain sugar, moderation in carbohydrate intake, increasing intake of lean protein, reducing intake of saturated and trans fat and reducing intake of cholesterol  Exercise recommendations include exercising 3-5 times per week  No pharmacotherapy was ordered  Patient referred to PCP due to patient being overweight           Preventive health issues were discussed with patient, and age appropriate screening tests were ordered as noted in patient's After Visit Summary  Personalized health advice and appropriate referrals for health education or preventive services given if needed, as noted in patient's After Visit Summary  History of Present Illness:     Patient presents for Medicare Annual Wellness visit  The patient has been feeling well on her current medications and has no complaints today  The patient denies any chest pain, shortness of breath, or palpitations  There is no edema  There are no headaches or visual changes  There is no lightheadedness, dizziness, or fainting spells  The patient currently denies any nausea, vomiting, or GERD symptoms  she has normal bowel movements and normal urine output  she has a normal appetite          Patient Care Team:  Neela Cuevas DO as PCP - General  Missy Mouse, MD Inocencio Baumgarten, MD Marlana Cozier, DO Niel Rosser, MD     Problem List:     Patient Active Problem List   Diagnosis    History of palpitations    Anxiety    Cataract of left eye    Chronic pain of left knee    Depression    Esophagitis, reflux    Exercise-induced asthma    Ventricular tachycardia ([address] )    Varicose veins of left lower extremity    Sleep apnea    Atrophic vaginitis    Palpitations    Overactive bladder    Osteoarthritis of left knee    Nonalcoholic fatty liver disease    Hypertension    Hyperlipidemia      Past Medical and Surgical History:     Past Medical History:   Diagnosis Date    Anxiety     Depression     Hyperlipidemia     Hypertension     Overactive bladder      Past Surgical History:   Procedure Laterality Date    ABDOMINOPLASTY      BREAST BIOPSY Right 1962    excisional biopsy-benign    CATARACT EXTRACTION, BILATERAL      INCONTINENCE SURGERY      REPLACEMENT TOTAL KNEE Right 2007    TUBAL LIGATION      VEIN LIGATION AND STRIPPING        Family History:     Family History   Problem Relation Age of Onset    No Known Problems Sister     No Known Problems Daughter     No Known Problems Sister     No Known Problems Sister     No Known Problems Maternal Aunt       Social History:     Social History     Socioeconomic History    Marital status: /Civil Union     Spouse name: None    Number of children: None    Years of education: None    Highest education level: None   Occupational History    None   Social Needs    Financial resource strain: None    Food insecurity:     Worry: None     Inability: None    Transportation needs:     Medical: None     Non-medical: None   Tobacco Use    Smoking status: Never Smoker    Smokeless tobacco: Never Used   Substance and Sexual Activity    Alcohol use: Yes     Comment: rarely    Drug use: No    Sexual activity: Not Currently   Lifestyle    Physical activity:     Days per week: None     Minutes per session: None    Stress: None   Relationships    Social connections:     Talks on phone: None     Gets together: None     Attends Mosque service: None     Active member of club or organization: None     Attends meetings of clubs or organizations: None     Relationship status: None    Intimate partner violence:     Fear of current or ex partner: None     Emotionally abused: None     Physically abused: None     Forced sexual activity: None   Other Topics Concern    None   Social History Narrative    None       Medications and Allergies:     Current Outpatient Medications   Medication Sig Dispense Refill    Calcium Carb-Cholecalciferol (CALCIUM 1000 + D PO) Take 1 tablet by mouth daily        escitalopram (LEXAPRO) 20 mg tablet Take 1 tablet (20 mg total) by mouth daily 90 tablet 1    metoprolol succinate (TOPROL-XL) 100 mg 24 hr tablet Take 1 tablet (100 mg total) by mouth daily 90 tablet 1    oxybutynin (DITROPAN-XL) 10 MG 24 hr tablet Take 1 tablet (10 mg total) by mouth daily 90 tablet 1    pantoprazole (PROTONIX) 40 mg tablet Take 1 tablet (40 mg total) by mouth daily 90 tablet 1    Zoster Vac Recomb Adjuvanted (SHINGRIX) 50 MCG/0 5ML SUSR Inject 1 Dose into a muscle once for 1 dose 1 each 1     No current facility-administered medications for this visit  Allergies   Allergen Reactions    Trospium      Other reaction(s): Constipation  Annotation - [id number]: mouth ulcers    Vioxx [Rofecoxib] GI Intolerance      Immunizations:     Immunization History   Administered Date(s) Administered    Hep A, adult 02/17/2010    INFLUENZA 09/07/2018    Influenza Quadrivalent, 6-35 Months IM 10/02/2016    Influenza Split High Dose Preservative Free IM 09/15/2015    Influenza TIV (IM) 10/23/2013, 10/01/2014, 09/13/2017    Pneumococcal Conjugate 13-Valent 09/15/2015, 01/23/2017    Pneumococcal Polysaccharide PPV23 05/13/2014    TD (adult) Preservative Free 02/17/2010    Zoster 06/12/2008    influenza, trivalent, adjuvanted 10/04/2019      Health Maintenance:         Topic Date Due    CRC Screening: Colonoscopy  10/23/2017    MAMMOGRAM  06/10/2020    Hepatitis C Screening  Completed     There are no preventive care reminders to display for this patient  Medicare Health Risk Assessment:     /76 (BP Location: Left arm, Patient Position: Sitting, Cuff Size: Large)   Pulse 55   Temp 99 °F (37 2 °C) (Tympanic)   Ht 5' 5 5" (1 664 m)   Wt 95 3 kg (210 lb)   SpO2 97%   BMI 34 41 kg/m²      Annika is here for her Subsequent Wellness visit  Last Medicare Wellness visit information reviewed, patient interviewed and updates made to the record today  Health Risk Assessment:   Patient rates overall health as excellent  Patient feels that their physical health rating is same  Eyesight was rated as same  Hearing was rated as same  Patient feels that their emotional and mental health rating is same  Pain experienced in the last 7 days has been none  Patient states that she has experienced no weight loss or gain in last 6 months  Wears hearing aides        Depression Screening:   PHQ-2 Score: 0  PHQ-9 Score: 0      Fall Risk Screening: In the past year, patient has experienced: history of falling in past year    Number of falls: 2 or more  Injured during fall?: No    Feels unsteady when standing or walking?: Yes    Worried about falling?: No      Urinary Incontinence Screening:   Patient has leaked urine accidently in the last six months  Home Safety:  Patient has trouble with stairs inside or outside of their home  Patient has working smoke alarms and has no working carbon monoxide detector  Home safety hazards include: none  Nutrition:   Current diet is Regular and Limited junk food  Weight Watchers - just joined  Medications:   Patient is currently taking over-the-counter supplements  OTC medications include: see medication list  Patient is able to manage medications  Activities of Daily Living (ADLs)/Instrumental Activities of Daily Living (IADLs):   Walk and transfer into and out of bed and chair?: Yes  Dress and groom yourself?: Yes    Bathe or shower yourself?: Yes    Feed yourself? Yes  Do your laundry/housekeeping?: Yes  Manage your money, pay your bills and track your expenses?: Yes  Make your own meals?: Yes    Do your own shopping?: Yes    Previous Hospitalizations:   Any hospitalizations or ED visits within the last 12 months?: No      Advance Care Planning:   Living will: Yes    Durable POA for healthcare:  Yes    Advanced directive: Yes    Advanced directive counseling given: Yes    Five wishes given: No    Patient declined ACP directive: No    End of Life Decisions reviewed with patient: Yes    Provider agrees with end of life decisions: Yes      Cognitive Screening:   Provider or family/friend/caregiver concerned regarding cognition?: No    PREVENTIVE SCREENINGS      Cardiovascular Screening:    General: Screening Not Indicated and History Lipid Disorder      Diabetes Screening:     General: Screening Current      Colorectal Cancer Screening:     General: Screening Current Breast Cancer Screening:     General: Screening Current      Cervical Cancer Screening:    General: Screening Not Indicated      Osteoporosis Screening:    General: Screening Current      Abdominal Aortic Aneurysm (AAA) Screening:        General: Screening Not Indicated      Lung Cancer Screening:     General: Screening Not Indicated      Hepatitis C Screening:    General: Screening Current    Other Counseling Topics:   Skin self-exam, sunscreen and calcium and vitamin D intake and regular weightbearing exercise         James Prasad DO

## 2020-03-03 DIAGNOSIS — K21.00 ESOPHAGITIS, REFLUX: ICD-10-CM

## 2020-03-03 DIAGNOSIS — F41.9 ANXIETY: ICD-10-CM

## 2020-03-03 DIAGNOSIS — F32.A DEPRESSION, UNSPECIFIED DEPRESSION TYPE: ICD-10-CM

## 2020-03-03 DIAGNOSIS — I10 ESSENTIAL HYPERTENSION: ICD-10-CM

## 2020-03-03 DIAGNOSIS — N32.81 OVERACTIVE BLADDER: ICD-10-CM

## 2020-03-03 RX ORDER — ESCITALOPRAM OXALATE 20 MG/1
20 TABLET ORAL DAILY
Qty: 90 TABLET | Refills: 1 | Status: SHIPPED | OUTPATIENT
Start: 2020-03-03 | End: 2020-10-28 | Stop reason: SDUPTHER

## 2020-03-03 RX ORDER — OXYBUTYNIN CHLORIDE 10 MG/1
10 TABLET, EXTENDED RELEASE ORAL DAILY
Qty: 90 TABLET | Refills: 1 | Status: SHIPPED | OUTPATIENT
Start: 2020-03-03 | End: 2020-10-28 | Stop reason: SDUPTHER

## 2020-03-03 RX ORDER — PANTOPRAZOLE SODIUM 40 MG/1
40 TABLET, DELAYED RELEASE ORAL DAILY
Qty: 90 TABLET | Refills: 1 | Status: SHIPPED | OUTPATIENT
Start: 2020-03-03 | End: 2020-10-28 | Stop reason: SDUPTHER

## 2020-03-03 RX ORDER — METOPROLOL SUCCINATE 100 MG/1
100 TABLET, EXTENDED RELEASE ORAL DAILY
Qty: 90 TABLET | Refills: 1 | Status: SHIPPED | OUTPATIENT
Start: 2020-03-03 | End: 2020-10-28 | Stop reason: SDUPTHER

## 2020-03-03 NOTE — TELEPHONE ENCOUNTER
escitalopram (LEXAPRO) 20 mg tablet  oxybutynin (DITROPAN-XL) 10 MG 24 hr tablet  metoprolol succinate (TOPROL-XL) 100 mg 24 hr tablet  pantoprazole (PROTONIX) 40 mg tablet    [facility]      [phone]  ANY QUESTIONS

## 2020-05-18 ENCOUNTER — TELEPHONE (OUTPATIENT)
Dept: FAMILY MEDICINE CLINIC | Facility: CLINIC | Age: 75
End: 2020-05-18

## 2020-05-18 DIAGNOSIS — Z12.31 ENCOUNTER FOR SCREENING MAMMOGRAM FOR BREAST CANCER: Primary | ICD-10-CM

## 2020-06-22 ENCOUNTER — HOSPITAL ENCOUNTER (OUTPATIENT)
Dept: BONE DENSITY | Facility: CLINIC | Age: 75
Discharge: HOME | End: 2020-06-22
Payer: MEDICARE

## 2020-06-22 VITALS — HEIGHT: 66 IN | BODY MASS INDEX: 33.75 KG/M2 | WEIGHT: 210 LBS

## 2020-06-22 DIAGNOSIS — Z12.31 ENCOUNTER FOR SCREENING MAMMOGRAM FOR BREAST CANCER: ICD-10-CM

## 2020-06-22 PROCEDURE — 77063 BREAST TOMOSYNTHESIS BI: CPT

## 2020-06-22 PROCEDURE — 77067 SCR MAMMO BI INCL CAD: CPT

## 2020-07-20 ENCOUNTER — TELEPHONE (OUTPATIENT)
Dept: FAMILY MEDICINE CLINIC | Facility: CLINIC | Age: 75
End: 2020-07-20

## 2020-07-20 DIAGNOSIS — J45.990 EXERCISE-INDUCED ASTHMA: Primary | ICD-10-CM

## 2020-07-20 RX ORDER — FLUTICASONE PROPIONATE 44 UG/1
2 AEROSOL, METERED RESPIRATORY (INHALATION) 2 TIMES DAILY
Qty: 1 INHALER | Refills: 5 | Status: SHIPPED | OUTPATIENT
Start: 2020-07-20 | End: 2021-10-06

## 2020-07-20 NOTE — TELEPHONE ENCOUNTER
[address] [place] [address] would like to try this if it doesn't work she will make an appt to come in to see you      [facility]  [phone]      [address] [place] [address]

## 2020-08-18 ENCOUNTER — TELEPHONE (OUTPATIENT)
Dept: OBGYN CLINIC | Facility: CLINIC | Age: 75
End: 2020-08-18

## 2020-08-18 NOTE — TELEPHONE ENCOUNTER
Patient called to report she missed her appointment in the [place] office today  Informed patient she was not scheduled with satinder TOURE for today or at all  Advised double check which MD she was planning to see

## 2020-10-28 ENCOUNTER — TELEPHONE (OUTPATIENT)
Dept: FAMILY MEDICINE CLINIC | Facility: CLINIC | Age: 75
End: 2020-10-28

## 2020-10-28 DIAGNOSIS — N32.81 OVERACTIVE BLADDER: ICD-10-CM

## 2020-10-28 DIAGNOSIS — I10 ESSENTIAL HYPERTENSION: ICD-10-CM

## 2020-10-28 DIAGNOSIS — F32.A DEPRESSION, UNSPECIFIED DEPRESSION TYPE: ICD-10-CM

## 2020-10-28 DIAGNOSIS — F41.9 ANXIETY: ICD-10-CM

## 2020-10-28 DIAGNOSIS — K21.00 ESOPHAGITIS, REFLUX: ICD-10-CM

## 2020-10-28 RX ORDER — METOPROLOL SUCCINATE 100 MG/1
100 TABLET, EXTENDED RELEASE ORAL DAILY
Qty: 90 TABLET | Refills: 1 | Status: SHIPPED | OUTPATIENT
Start: 2020-10-28 | End: 2021-05-17 | Stop reason: SDUPTHER

## 2020-10-28 RX ORDER — ESCITALOPRAM OXALATE 20 MG/1
20 TABLET ORAL DAILY
Qty: 90 TABLET | Refills: 1 | Status: SHIPPED | OUTPATIENT
Start: 2020-10-28 | End: 2020-11-06 | Stop reason: SDUPTHER

## 2020-10-28 RX ORDER — PANTOPRAZOLE SODIUM 40 MG/1
40 TABLET, DELAYED RELEASE ORAL DAILY
Qty: 90 TABLET | Refills: 1 | Status: SHIPPED | OUTPATIENT
Start: 2020-10-28 | End: 2021-06-21 | Stop reason: ALTCHOICE

## 2020-10-28 RX ORDER — OXYBUTYNIN CHLORIDE 10 MG/1
10 TABLET, EXTENDED RELEASE ORAL DAILY
Qty: 90 TABLET | Refills: 1 | Status: SHIPPED | OUTPATIENT
Start: 2020-10-28 | End: 2021-05-17 | Stop reason: SDUPTHER

## 2020-11-06 ENCOUNTER — APPOINTMENT (OUTPATIENT)
Dept: RADIOLOGY | Facility: CLINIC | Age: 75
End: 2020-11-06
Payer: MEDICARE

## 2020-11-06 ENCOUNTER — OFFICE VISIT (OUTPATIENT)
Dept: FAMILY MEDICINE CLINIC | Facility: CLINIC | Age: 75
End: 2020-11-06
Payer: MEDICARE

## 2020-11-06 VITALS
HEART RATE: 63 BPM | OXYGEN SATURATION: 91 % | HEIGHT: 66 IN | TEMPERATURE: 96.9 F | BODY MASS INDEX: 34.07 KG/M2 | DIASTOLIC BLOOD PRESSURE: 62 MMHG | WEIGHT: 212 LBS | SYSTOLIC BLOOD PRESSURE: 118 MMHG

## 2020-11-06 DIAGNOSIS — R09.3 EXCESSIVE SPUTUM: ICD-10-CM

## 2020-11-06 DIAGNOSIS — J45.990 EXERCISE-INDUCED ASTHMA: ICD-10-CM

## 2020-11-06 DIAGNOSIS — I10 ESSENTIAL HYPERTENSION: ICD-10-CM

## 2020-11-06 DIAGNOSIS — F32.A DEPRESSION, UNSPECIFIED DEPRESSION TYPE: ICD-10-CM

## 2020-11-06 DIAGNOSIS — F41.9 ANXIETY: ICD-10-CM

## 2020-11-06 DIAGNOSIS — R09.3 EXCESSIVE SPUTUM: Primary | ICD-10-CM

## 2020-11-06 PROCEDURE — 99214 OFFICE O/P EST MOD 30 MIN: CPT | Performed by: FAMILY MEDICINE

## 2020-11-06 PROCEDURE — 71046 X-RAY EXAM CHEST 2 VIEWS: CPT

## 2020-11-06 RX ORDER — ESCITALOPRAM OXALATE 20 MG/1
30 TABLET ORAL DAILY
Qty: 90 TABLET | Refills: 1
Start: 2020-11-06 | End: 2021-01-13 | Stop reason: SDUPTHER

## 2021-01-04 ENCOUNTER — TELEMEDICINE (OUTPATIENT)
Dept: FAMILY MEDICINE CLINIC | Facility: CLINIC | Age: 76
End: 2021-01-04
Payer: MEDICARE

## 2021-01-04 VITALS — WEIGHT: 211.7 LBS | BODY MASS INDEX: 34.69 KG/M2

## 2021-01-04 DIAGNOSIS — S09.90XA TRAUMATIC INJURY OF HEAD, INITIAL ENCOUNTER: ICD-10-CM

## 2021-01-04 DIAGNOSIS — S00.03XA SCALP HEMATOMA, INITIAL ENCOUNTER: Primary | ICD-10-CM

## 2021-01-04 PROCEDURE — 99442 PR PHYS/QHP TELEPHONE EVALUATION 11-20 MIN: CPT | Performed by: FAMILY MEDICINE

## 2021-01-04 NOTE — PROGRESS NOTES
Virtual Brief Visit    Assessment/Plan:    Problem List Items Addressed This Visit     None      Visit Diagnoses     Scalp hematoma, initial encounter    -  Primary    Traumatic injury of head, initial encounter          I advised the patient that the lump that she is palpating is most likely resolving hematoma from her fall  The patient has no other symptoms at this point  I advised her that it will gradually resolve on its own  She is to follow-up in the office with any worsening pain, nausea, vomiting, worsening headaches, or visual changes  We will need to physically see her in the office  For now she can try Tylenol as needed for the discomfort and this will eventually resolve on its own  We will see her back as needed  BMI Counseling: Body mass index is 34 69 kg/m²  The BMI is above normal  Nutrition recommendations include decreasing portion sizes, encouraging healthy choices of fruits and vegetables, decreasing fast food intake, consuming healthier snacks, limiting drinks that contain sugar, moderation in carbohydrate intake, increasing intake of lean protein, reducing intake of saturated and trans fat and reducing intake of cholesterol  Exercise recommendations include exercising 3-5 times per week  No pharmacotherapy was ordered  Patient referred to PCP due to patient being overweight  Reason for visit is   Chief Complaint   Patient presents with    Head Injury     [place] 1 week ago lump back of head still painful when she lies down   Virtual Brief Visit        Encounter provider Neela Cuevas DO    Provider located at [address] [place] [id number] [phone]    Recent Visits  Date Type Provider Dept   01/04/21 Telemedicine Neela Cuevas DO [facility]   Showing recent visits within past 7 days and meeting all other requirements     Future Appointments  No visits were found meeting these conditions  Showing future appointments within next 150 days and meeting all other requirements        After connecting through telephone, the patient was identified by name and date of birth  Michael Olsen was informed that this is a telemedicine visit and that the visit is being conducted through telephone  My office door was closed  No one else was in the room  She acknowledged consent and understanding of privacy and security of the platform  The patient has agreed to participate and understands she can discontinue the visit at any time  It was my intent to perform this visit via video technology but the patient was not able to do a video connection so the visit was completed via audio telephone only  Patient is aware this is a billable service  Subjective    Michael Olsen is a 77 y o  female who presents today for a virtual visit to discuss a recent fall  Michael Olsen is a 77 y o  female who presents today by way of a virtual telephone visit to discuss her recent fall that she had about a week ago  Patient states she was walking outside and slipped on the ice and fell backwards and hit her head on the ground  She states that she may have lost consciousness for a few seconds  She states following that she palpated a large swollen area on the back of her head  Patient chose not to go to the emergency room or for medical evaluation after the fall due to her fears of COVID-19  She decided to go home and monitor the situation  She states that she did have a headache for few days which has subsided  She denied any black outs or memory loss  There is no numbness or tingling in her face, slurring of her speech, or weakness in her extremities  She did have a palpable area on her posterior scalp which was rather large at 1st and gradually went down in size  She now has a small tender area on her posterior scalp that is about the size of a quarter  It is firm to palpation    It does not bother her unless she places pressure on it  She denies any other symptoms  There are no further headaches or visual changes  Past Medical History:   Diagnosis Date    Anxiety     Depression     Hyperlipidemia     Hypertension     Overactive bladder        Past Surgical History:   Procedure Laterality Date    ABDOMINOPLASTY      BREAST BIOPSY Right 1962    excisional biopsy-benign    CATARACT EXTRACTION, BILATERAL      INCONTINENCE SURGERY      REPLACEMENT TOTAL KNEE Right 2007    TUBAL LIGATION      VEIN LIGATION AND STRIPPING         Current Outpatient Medications   Medication Sig Dispense Refill    Calcium Carb-Cholecalciferol (CALCIUM 1000 + D PO) Take 1 tablet by mouth daily   escitalopram (LEXAPRO) 20 mg tablet Take 1 5 tablets (30 mg total) by mouth daily 90 tablet 1    fluticasone (Flovent HFA) 44 mcg/act inhaler Inhale 2 puffs 2 (two) times a day 1 Inhaler 5    metoprolol succinate (TOPROL-XL) 100 mg 24 hr tablet Take 1 tablet (100 mg total) by mouth daily 90 tablet 1    oxybutynin (DITROPAN-XL) 10 MG 24 hr tablet Take 1 tablet (10 mg total) by mouth daily 90 tablet 1    pantoprazole (PROTONIX) 40 mg tablet Take 1 tablet (40 mg total) by mouth daily 90 tablet 1     No current facility-administered medications for this visit  Allergies   Allergen Reactions    Trospium      Other reaction(s): Constipation  Annotation - [id number]: mouth ulcers    Vioxx [Rofecoxib] GI Intolerance       Review of Systems   Constitutional: Negative  HENT: Negative  Eyes: Negative  Respiratory: Negative  Cardiovascular: Negative  Gastrointestinal: Negative  Endocrine: Negative  Genitourinary: Negative  Musculoskeletal: Negative  Skin: Negative  Allergic/Immunologic: Negative  Neurological: Negative  Negative for dizziness, tremors, seizures, syncope, facial asymmetry, speech difficulty, weakness, light-headedness, numbness and headaches     Hematological: Negative  Psychiatric/Behavioral: Negative  Vitals:    01/04/21 1327   Weight: 96 kg (211 lb 11 2 oz)   The above vitals were obtained by the patient at home and reported to our office since this is a virtual visit  A physical exam could not be performed due to the fact this was a telephone visit  The patient was awake, alert, and oriented and answered all the questions appropriately  she was in no acute distress  I spent 15 minutes directly with the patient during this visit    [address] acknowledges that she has consented to an online visit or consultation  She understands that the online visit is based solely on information provided by her, and that, in the absence of a face-to-face physical evaluation by the physician, the diagnosis she receives is both limited and provisional in terms of accuracy and completeness  This is not intended to replace a full medical face-to-face evaluation by the physician  Christel Rush understands and accepts these terms

## 2021-01-13 ENCOUNTER — TELEPHONE (OUTPATIENT)
Dept: FAMILY MEDICINE CLINIC | Facility: CLINIC | Age: 76
End: 2021-01-13

## 2021-01-13 DIAGNOSIS — F41.9 ANXIETY: ICD-10-CM

## 2021-01-13 DIAGNOSIS — F32.A DEPRESSION, UNSPECIFIED DEPRESSION TYPE: ICD-10-CM

## 2021-01-13 RX ORDER — ESCITALOPRAM OXALATE 20 MG/1
30 TABLET ORAL DAILY
Qty: 145 TABLET | Refills: 1 | Status: SHIPPED | OUTPATIENT
Start: 2021-01-13 | End: 2021-10-06 | Stop reason: SDUPTHER

## 2021-01-13 NOTE — TELEPHONE ENCOUNTER
escitalopram (LEXAPRO) 20 mg tablet [[id number]    Patient called, she asked if she could get the 30mg tablets instead of breaking the pill in half daily  She needs Rx sent to [facility]  She gets a 90 day supply  Thank you

## 2021-01-13 NOTE — TELEPHONE ENCOUNTER
Sury called to make sure she would have enough pills due to splitting them  Please call her    [phone]

## 2021-01-13 NOTE — TELEPHONE ENCOUNTER
Called and explained to the patient that Dr Mingo Juares gave her more pills with this prescription to accommodate her increased dose  Patient understands and has no questions or concerns to be addressed at this time

## 2021-03-03 DIAGNOSIS — Z23 ENCOUNTER FOR IMMUNIZATION: ICD-10-CM

## 2021-04-26 ENCOUNTER — OFFICE VISIT (OUTPATIENT)
Dept: URGENT CARE | Facility: CLINIC | Age: 76
End: 2021-04-26
Payer: MEDICARE

## 2021-04-26 VITALS
RESPIRATION RATE: 16 BRPM | TEMPERATURE: 97.7 F | OXYGEN SATURATION: 95 % | BODY MASS INDEX: 31.13 KG/M2 | HEART RATE: 68 BPM | WEIGHT: 205.4 LBS | SYSTOLIC BLOOD PRESSURE: 118 MMHG | DIASTOLIC BLOOD PRESSURE: 70 MMHG | HEIGHT: 68 IN

## 2021-04-26 DIAGNOSIS — R31.9 URINARY TRACT INFECTION WITH HEMATURIA, SITE UNSPECIFIED: Primary | ICD-10-CM

## 2021-04-26 DIAGNOSIS — N39.0 URINARY TRACT INFECTION WITH HEMATURIA, SITE UNSPECIFIED: Primary | ICD-10-CM

## 2021-04-26 LAB
SL AMB  POCT GLUCOSE, UA: ABNORMAL
SL AMB LEUKOCYTE ESTERASE,UA: ABNORMAL
SL AMB POCT BILIRUBIN,UA: ABNORMAL
SL AMB POCT BLOOD,UA: ABNORMAL
SL AMB POCT CLARITY,UA: ABNORMAL
SL AMB POCT COLOR,UA: YELLOW
SL AMB POCT KETONES,UA: ABNORMAL
SL AMB POCT NITRITE,UA: ABNORMAL
SL AMB POCT PH,UA: 6.5
SL AMB POCT SPECIFIC GRAVITY,UA: 1.01
SL AMB POCT URINE PROTEIN: ABNORMAL
SL AMB POCT UROBILINOGEN: 0.2

## 2021-04-26 PROCEDURE — 87077 CULTURE AEROBIC IDENTIFY: CPT | Performed by: PHYSICIAN ASSISTANT

## 2021-04-26 PROCEDURE — G0463 HOSPITAL OUTPT CLINIC VISIT: HCPCS | Performed by: PHYSICIAN ASSISTANT

## 2021-04-26 PROCEDURE — 99213 OFFICE O/P EST LOW 20 MIN: CPT | Performed by: PHYSICIAN ASSISTANT

## 2021-04-26 PROCEDURE — 81002 URINALYSIS NONAUTO W/O SCOPE: CPT | Performed by: PHYSICIAN ASSISTANT

## 2021-04-26 PROCEDURE — 87086 URINE CULTURE/COLONY COUNT: CPT | Performed by: PHYSICIAN ASSISTANT

## 2021-04-26 PROCEDURE — 87186 SC STD MICRODIL/AGAR DIL: CPT | Performed by: PHYSICIAN ASSISTANT

## 2021-04-26 RX ORDER — FLUCONAZOLE 200 MG/1
200 TABLET ORAL ONCE
Qty: 1 TABLET | Refills: 0 | Status: SHIPPED | OUTPATIENT
Start: 2021-04-26 | End: 2021-04-26

## 2021-04-26 RX ORDER — CEPHALEXIN 500 MG/1
500 CAPSULE ORAL EVERY 12 HOURS SCHEDULED
Qty: 14 CAPSULE | Refills: 0 | Status: SHIPPED | OUTPATIENT
Start: 2021-04-26 | End: 2021-05-03

## 2021-04-26 NOTE — PATIENT INSTRUCTIONS
Urinary Tract Infection in Women   WHAT YOU NEED TO KNOW:   A urinary tract infection (UTI) is caused by bacteria that get inside your urinary tract  Most bacteria that enter your urinary tract come out when you urinate  If the bacteria stay in your urinary tract, you may get an infection  Your urinary tract includes your kidneys, ureters, bladder, and urethra  Urine is made in your kidneys, and it flows from the ureters to the bladder  Urine leaves the bladder through the urethra  A UTI is more common in your lower urinary tract, which includes your bladder and urethra  DISCHARGE INSTRUCTIONS:   Return to the emergency department if:   · You are urinating very little or not at all  · You have a high fever with shaking chills  · You have side or back pain that gets worse  Call your doctor if:   · You have a fever  · You do not feel better after 2 days of taking antibiotics  · You are vomiting  · You have questions or concerns about your condition or care  Medicines:   · Antibiotics  help fight a bacterial infection  If you have UTIs often (called recurrent UTIs), you may be given antibiotics to take regularly  You will be given directions for when and how to use antibiotics  The goal is to prevent UTIs but not cause antibiotic resistance by using antibiotics too often  · Medicines  may be given to decrease pain and burning when you urinate  They will also help decrease the feeling that you need to urinate often  These medicines will make your urine orange or red  · Take your medicine as directed  Contact your healthcare provider if you think your medicine is not helping or if you have side effects  Tell him or her if you are allergic to any medicine  Keep a list of the medicines, vitamins, and herbs you take  Include the amounts, and when and why you take them  Bring the list or the pill bottles to follow-up visits   Carry your medicine list with you in case of an emergency  Follow up with your healthcare provider as directed:  Write down your questions so you remember to ask them during your visits  Prevent another UTI:   · Empty your bladder often  Urinate and empty your bladder as soon as you feel the need  Do not hold your urine for long periods of time  · Wipe from front to back after you urinate or have a bowel movement  This will help prevent germs from getting into your urinary tract through your urethra  · Drink liquids as directed  Ask how much liquid to drink each day and which liquids are best for you  You may need to drink more liquids than usual to help flush out the bacteria  Do not drink alcohol, caffeine, or citrus juices  These can irritate your bladder and increase your symptoms  Your healthcare provider may recommend cranberry juice to help prevent a UTI  · Urinate after you have sex  This can help flush out bacteria passed during sex  · Do not douche or use feminine deodorants  These can change the chemical balance in your vagina  · Change sanitary pads or tampons often  This will help prevent germs from getting into your urinary tract  · Talk to your healthcare provider about your birth control method  You may need to change your method if it is increasing your risk for UTIs  · Wear cotton underwear and clothes that are loose  Tight pants and nylon underwear can trap moisture and cause bacteria to grow  · Vaginal estrogen may be recommended  This medicine helps prevent UTIs in women who have gone through menopause or are in lenny-menopause  · Do pelvic muscle exercises often  Pelvic muscle exercises may help you start and stop urinating  Strong pelvic muscles may help you empty your bladder easier  Squeeze these muscles tightly for 5 seconds like you are trying to hold back urine  Then relax for 5 seconds  Gradually work up to squeezing for 10 seconds  Do 3 sets of 15 repetitions a day, or as directed      © Copyright [address] Information is for [employer] use only and may not be sold, redistributed or otherwise used for commercial purposes  All illustrations and images included in CareNotes® are the copyrighted property of A D A M , Inc  or Kelver Tran  The above information is an  only  It is not intended as medical advice for individual conditions or treatments  Talk to your doctor, nurse or pharmacist before following any medical regimen to see if it is safe and effective for you

## 2021-04-26 NOTE — PROGRESS NOTES
[facility]        NAME: Brandie Friedman is a 77 y o  female  : 1945    MRN: [id number]  DATE: 2021  TIME: 11:12 AM    Assessment and Plan   Urinary tract infection with hematuria, site unspecified [N39 0, R31 9]  1  Urinary tract infection with hematuria, site unspecified  POCT urine dip    Urine culture    cephalexin (KEFLEX) 500 mg capsule    fluconazole (DIFLUCAN) 200 mg tablet         Patient Instructions       Follow up with PCP in 3-5 days  Proceed to  ER if symptoms worsen  Chief Complaint     Chief Complaint   Patient presents with    Urinary Tract Infection     onset last week - feeling of not emptying bladder and chills         History of Present Illness       Year old female presents to clinic with dysuria, frequency, urgency and episodes of incontinence that started last week  Patient states she has feeling her blood not fully emptying and states that over the weekend she episodes of chills  Patient states she has taken Tylenol for her chills but has not taken any other OTC treatment for her urinary symptoms  Patient states she did try 1 dose of water with baking soda but states that it did not help her symptoms  Patient denies abdominal pressure, flank pain, back pain, hematuria, diarrhea or constipation  Review of Systems   Review of Systems   Constitutional: Positive for chills  Negative for fever  Respiratory: Negative for shortness of breath  Cardiovascular: Negative for chest pain and palpitations  Gastrointestinal: Negative for abdominal pain, constipation, diarrhea, nausea and vomiting  Genitourinary: Positive for dysuria, frequency and urgency  Negative for flank pain and hematuria  Episodes of incontinence   Musculoskeletal: Negative for back pain  Neurological: Negative for dizziness, syncope, weakness, light-headedness and headaches  All other systems reviewed and are negative          Current Medications       Current Outpatient Medications:     Calcium Carb-Cholecalciferol (CALCIUM 1000 + D PO), Take 1 tablet by mouth daily  , Disp: , Rfl:     escitalopram (LEXAPRO) 20 mg tablet, Take 1 5 tablets (30 mg total) by mouth daily, Disp: 145 tablet, Rfl: 1    metoprolol succinate (TOPROL-XL) 100 mg 24 hr tablet, Take 1 tablet (100 mg total) by mouth daily, Disp: 90 tablet, Rfl: 1    oxybutynin (DITROPAN-XL) 10 MG 24 hr tablet, Take 1 tablet (10 mg total) by mouth daily, Disp: 90 tablet, Rfl: 1    pantoprazole (PROTONIX) 40 mg tablet, Take 1 tablet (40 mg total) by mouth daily, Disp: 90 tablet, Rfl: 1    cephalexin (KEFLEX) 500 mg capsule, Take 1 capsule (500 mg total) by mouth every 12 (twelve) hours for 7 days, Disp: 14 capsule, Rfl: 0    fluconazole (DIFLUCAN) 200 mg tablet, Take 1 tablet (200 mg total) by mouth once for 1 dose, Disp: 1 tablet, Rfl: 0    fluticasone (Flovent HFA) 44 mcg/act inhaler, Inhale 2 puffs 2 (two) times a day (Patient not taking: Reported on 4/26/2021), Disp: 1 Inhaler, Rfl: 5    Current Allergies     Allergies as of 04/26/2021 - Reviewed 04/26/2021   Allergen Reaction Noted    Trospium  04/15/2015    Vioxx [rofecoxib] GI Intolerance 11/06/2012            The following portions of the patient's history were reviewed and updated as appropriate: allergies, current medications, past family history, past medical history, past social history, past surgical history and problem list      Past Medical History:   Diagnosis Date    Anxiety     Depression     Hyperlipidemia     Hypertension     Overactive bladder        Past Surgical History:   Procedure Laterality Date    ABDOMINOPLASTY      BREAST BIOPSY Right 1962    excisional biopsy-benign    CATARACT EXTRACTION, BILATERAL      INCONTINENCE SURGERY      REPLACEMENT TOTAL KNEE Right 2007    TUBAL LIGATION      VEIN LIGATION AND STRIPPING         Family History   Problem Relation Age of Onset    No Known Problems Sister     Melanoma Daughter  No Known Problems Sister     No Known Problems Sister     No Known Problems Maternal Aunt     No Known Problems Mother     No Known Problems Father     No Known Problems Maternal Grandmother     No Known Problems Maternal Grandfather     No Known Problems Paternal Grandmother     No Known Problems Paternal Grandfather          Medications have been verified  Objective   /70   Pulse 68   Temp 97 7 °F (36 5 °C) (Tympanic)   Resp 16   Ht 5' 7 5" (1 715 m)   Wt 93 2 kg (205 lb 6 4 oz)   SpO2 95%   BMI 31 70 kg/m²   No LMP recorded  Patient is postmenopausal        Physical Exam     Physical Exam  Vitals signs and nursing note reviewed  Constitutional:       General: She is not in acute distress  Appearance: She is well-developed  She is not diaphoretic  Pulmonary:      Effort: Pulmonary effort is normal    Abdominal:      General: Bowel sounds are normal       Palpations: Abdomen is soft  Tenderness: There is no abdominal tenderness  There is no right CVA tenderness, left CVA tenderness or guarding  Skin:     General: Skin is warm and dry  Neurological:      Mental Status: She is alert and oriented to person, place, and time

## 2021-04-28 LAB
BACTERIA UR CULT: ABNORMAL
BACTERIA UR CULT: ABNORMAL

## 2021-04-29 ENCOUNTER — TELEPHONE (OUTPATIENT)
Dept: URGENT CARE | Facility: CLINIC | Age: 76
End: 2021-04-29

## 2021-04-29 DIAGNOSIS — N30.01 ACUTE CYSTITIS WITH HEMATURIA: Primary | ICD-10-CM

## 2021-04-29 RX ORDER — LEVOFLOXACIN 500 MG/1
500 TABLET, FILM COATED ORAL DAILY
Qty: 7 TABLET | Refills: 0 | Status: SHIPPED | OUTPATIENT
Start: 2021-04-29 | End: 2021-05-06

## 2021-05-17 DIAGNOSIS — N32.81 OVERACTIVE BLADDER: ICD-10-CM

## 2021-05-17 DIAGNOSIS — I10 ESSENTIAL HYPERTENSION: ICD-10-CM

## 2021-05-17 RX ORDER — OXYBUTYNIN CHLORIDE 10 MG/1
10 TABLET, EXTENDED RELEASE ORAL DAILY
Qty: 90 TABLET | Refills: 1 | Status: SHIPPED | OUTPATIENT
Start: 2021-05-17 | End: 2021-09-09 | Stop reason: ALTCHOICE

## 2021-05-17 RX ORDER — METOPROLOL SUCCINATE 100 MG/1
100 TABLET, EXTENDED RELEASE ORAL DAILY
Qty: 90 TABLET | Refills: 1 | Status: SHIPPED | OUTPATIENT
Start: 2021-05-17 | End: 2021-10-06 | Stop reason: SDUPTHER

## 2021-06-07 ENCOUNTER — TELEPHONE (OUTPATIENT)
Dept: FAMILY MEDICINE CLINIC | Facility: CLINIC | Age: 76
End: 2021-06-07

## 2021-06-07 DIAGNOSIS — Z12.31 ENCOUNTER FOR SCREENING MAMMOGRAM FOR BREAST CANCER: Primary | ICD-10-CM

## 2021-06-07 NOTE — TELEPHONE ENCOUNTER
Rekha Oscar would like to go for her yearly  mammogram downstairs  Please notify her when complete    [phone]   Thank you

## 2021-06-18 ENCOUNTER — TELEPHONE (OUTPATIENT)
Dept: FAMILY MEDICINE CLINIC | Facility: CLINIC | Age: 76
End: 2021-06-18

## 2021-06-18 DIAGNOSIS — K21.00 GASTROESOPHAGEAL REFLUX DISEASE WITH ESOPHAGITIS WITHOUT HEMORRHAGE: Primary | ICD-10-CM

## 2021-06-18 NOTE — TELEPHONE ENCOUNTER
Patient called  She does not want to take to patoprazole  She heard it could cause kidney issues  She wants the Rx lansoprazole instead  I told her I would check with you and we would call her back  She asked if it could be sent to [facility] in [facility], since she can get it cheaper there

## 2021-06-21 RX ORDER — LANSOPRAZOLE 30 MG/1
30 TABLET, ORALLY DISINTEGRATING, DELAYED RELEASE ORAL DAILY
Qty: 30 TABLET | Refills: 5 | Status: SHIPPED | OUTPATIENT
Start: 2021-06-21 | End: 2021-06-22 | Stop reason: CLARIF

## 2021-06-22 DIAGNOSIS — K21.00 GASTROESOPHAGEAL REFLUX DISEASE WITH ESOPHAGITIS WITHOUT HEMORRHAGE: Primary | ICD-10-CM

## 2021-06-22 RX ORDER — LANSOPRAZOLE 30 MG/1
30 CAPSULE, DELAYED RELEASE ORAL DAILY
Qty: 90 CAPSULE | Refills: 1 | Status: SHIPPED | OUTPATIENT
Start: 2021-06-22 | End: 2021-10-06 | Stop reason: SDUPTHER

## 2021-06-22 NOTE — TELEPHONE ENCOUNTER
Patient states she wants the non disintegrating Prevacid (Lansoprazole) tablet  She would like 90-day supply sent to [facility] in [place]  Thank you!

## 2021-07-27 ENCOUNTER — HOSPITAL ENCOUNTER (OUTPATIENT)
Dept: MAMMOGRAPHY | Facility: CLINIC | Age: 76
Discharge: HOME | End: 2021-07-27
Payer: MEDICARE

## 2021-07-27 VITALS — HEIGHT: 68 IN | BODY MASS INDEX: 31.07 KG/M2 | WEIGHT: 205 LBS

## 2021-07-27 DIAGNOSIS — Z12.31 ENCOUNTER FOR SCREENING MAMMOGRAM FOR BREAST CANCER: ICD-10-CM

## 2021-07-27 PROCEDURE — 77067 SCR MAMMO BI INCL CAD: CPT

## 2021-07-27 PROCEDURE — 77063 BREAST TOMOSYNTHESIS BI: CPT

## 2021-09-09 ENCOUNTER — OFFICE VISIT (OUTPATIENT)
Dept: UROLOGY | Facility: HOSPITAL | Age: 76
End: 2021-09-09
Payer: MEDICARE

## 2021-09-09 VITALS
HEIGHT: 68 IN | SYSTOLIC BLOOD PRESSURE: 128 MMHG | WEIGHT: 214 LBS | HEART RATE: 64 BPM | DIASTOLIC BLOOD PRESSURE: 78 MMHG | BODY MASS INDEX: 32.43 KG/M2

## 2021-09-09 DIAGNOSIS — N32.81 OVERACTIVE BLADDER: Primary | ICD-10-CM

## 2021-09-09 DIAGNOSIS — N95.2 VAGINAL ATROPHY: ICD-10-CM

## 2021-09-09 LAB
BACTERIA UR QL AUTO: ABNORMAL /HPF
BILIRUB UR QL STRIP: NEGATIVE
CLARITY UR: CLEAR
COLOR UR: YELLOW
GLUCOSE UR STRIP-MCNC: NEGATIVE MG/DL
HGB UR QL STRIP.AUTO: NEGATIVE
HYALINE CASTS #/AREA URNS LPF: ABNORMAL /LPF
KETONES UR STRIP-MCNC: NEGATIVE MG/DL
LEUKOCYTE ESTERASE UR QL STRIP: ABNORMAL
NITRITE UR QL STRIP: NEGATIVE
NON-SQ EPI CELLS URNS QL MICRO: ABNORMAL /HPF
PH UR STRIP.AUTO: 6 [PH]
PROT UR STRIP-MCNC: NEGATIVE MG/DL
RBC #/AREA URNS AUTO: ABNORMAL /HPF
SL AMB  POCT GLUCOSE, UA: NORMAL
SL AMB LEUKOCYTE ESTERASE,UA: NORMAL
SL AMB POCT BILIRUBIN,UA: NORMAL
SL AMB POCT BLOOD,UA: NORMAL
SL AMB POCT CLARITY,UA: CLEAR
SL AMB POCT COLOR,UA: YELLOW
SL AMB POCT KETONES,UA: NORMAL
SL AMB POCT NITRITE,UA: NORMAL
SL AMB POCT PH,UA: 6
SL AMB POCT SPECIFIC GRAVITY,UA: 1.02
SL AMB POCT URINE PROTEIN: NORMAL
SL AMB POCT UROBILINOGEN: 0.2
SP GR UR STRIP.AUTO: 1.02 (ref 1–1.03)
UROBILINOGEN UR QL STRIP.AUTO: 0.2 E.U./DL
WBC #/AREA URNS AUTO: ABNORMAL /HPF

## 2021-09-09 PROCEDURE — 81002 URINALYSIS NONAUTO W/O SCOPE: CPT | Performed by: NURSE PRACTITIONER

## 2021-09-09 PROCEDURE — 81001 URINALYSIS AUTO W/SCOPE: CPT | Performed by: NURSE PRACTITIONER

## 2021-09-09 PROCEDURE — 99203 OFFICE O/P NEW LOW 30 MIN: CPT | Performed by: NURSE PRACTITIONER

## 2021-09-09 RX ORDER — OXYBUTYNIN CHLORIDE 15 MG/1
15 TABLET, EXTENDED RELEASE ORAL
Qty: 60 TABLET | Refills: 0 | Status: SHIPPED | OUTPATIENT
Start: 2021-09-09 | End: 2021-10-06

## 2021-09-09 RX ORDER — ESTRADIOL 0.1 MG/G
1 CREAM VAGINAL 3 TIMES WEEKLY
Qty: 42.5 G | Refills: 1 | Status: SHIPPED | OUTPATIENT
Start: 2021-09-10 | End: 2021-10-06

## 2021-09-09 NOTE — PROGRESS NOTES
09/09/21    Ember Jhaveri   1945   [id number]     Assessment  1 Urinary incontinence  2 Vaginal atrophy    Discussion/Plan  1 Urinary incontinence   We discussed diet, lifestyle and medication options as well as advanced treatment options in the office today including: PTNS, InterStim, bladder Botox  Pamphlets provided   Oxybutynin 15 mg ordered - we reviewed side effect profile   Bowel regimen recommended   Encouraged hydration with water and avoidance of bladder irritants  2 Vaginal atrophy    Estrace cream ordered - we reviewed mechanism of action and proper use of medication   Encouraged patient to continue with routine mammograms   Replens or coconut oil discussed as well     Patient will try medications and diet/lifestyle modifications for symptoms control  She wishes to call if her symptoms are not improving  She will consider PTNS  She will return as needed  Subjective  HPI   Sae Kay is a 77year old female who presents in consultation with reports of mixed urinary incontinence  She described urgency and leakage as well as incontinence with coughing, laughing, sneezing and position changes  She is managed on oxybutynin 10 mg XL  She wears a diaper overnight due to leakage of urine with position changes  She reports having bladder Botox twice and does not wish to have this again  She experiences constipation  She reports history of bowel impaction with use of Myrbetriq  She consumes 24-32 oz water daily, diet soda every other day  No coffee or tea  She denies dysuria, fever, chills  She has had multiple bladder sling surgeries and repairs     PMH: HTN, asthma, PAN, atrophic vaginitis, OAB, anxiety, depression, hyperlipidemia, bladder sling x 3     Review of Systems - History obtained from chart review and the patient  General ROS: negative  Psychological ROS: negative  Ophthalmic ROS: negative  ENT ROS: negative  Allergy and Immunology ROS: negative  Hematological and Lymphatic ROS: negative  Endocrine ROS: negative  Breast ROS: negative for breast lumps  Respiratory ROS: no cough, shortness of breath, or wheezing  Cardiovascular ROS: negative  Gastrointestinal ROS: no abdominal pain, change in bowel habits, or black or bloody stools  Genito-Urinary ROS: positive for - incontinence, vaginal dryness  Musculoskeletal ROS: negative  Neurological ROS: negative  Dermatological ROS: negative       Objective  Physical Exam  Vitals and nursing note reviewed  Constitutional:       General: She is awake  She is not in acute distress  Appearance: Normal appearance  She is well-developed, well-groomed and overweight  She is not ill-appearing, toxic-appearing or diaphoretic  HENT:      Head: Normocephalic and atraumatic  Pulmonary:      Effort: Pulmonary effort is normal  No respiratory distress  Abdominal:      Tenderness: There is no right CVA tenderness or left CVA tenderness  Musculoskeletal:         General: Normal range of motion  Cervical back: Normal range of motion  Skin:     General: Skin is warm and dry  Neurological:      General: No focal deficit present  Mental Status: She is alert and oriented to person, place, and time  Mental status is at baseline  Psychiatric:         Attention and Perception: Attention and perception normal          Mood and Affect: Mood and affect normal          Speech: Speech normal          Behavior: Behavior normal  Behavior is cooperative  Thought Content:  Thought content normal          Cognition and Memory: Cognition normal          Judgment: Judgment normal                Emiliano Springer

## 2021-10-04 ENCOUNTER — TELEPHONE (OUTPATIENT)
Dept: FAMILY MEDICINE CLINIC | Facility: CLINIC | Age: 76
End: 2021-10-04

## 2021-10-06 ENCOUNTER — TELEPHONE (OUTPATIENT)
Dept: UROLOGY | Facility: AMBULATORY SURGERY CENTER | Age: 76
End: 2021-10-06

## 2021-10-06 ENCOUNTER — OFFICE VISIT (OUTPATIENT)
Dept: FAMILY MEDICINE CLINIC | Facility: CLINIC | Age: 76
End: 2021-10-06
Payer: MEDICARE

## 2021-10-06 VITALS
HEIGHT: 68 IN | RESPIRATION RATE: 16 BRPM | HEART RATE: 58 BPM | SYSTOLIC BLOOD PRESSURE: 130 MMHG | TEMPERATURE: 98.4 F | OXYGEN SATURATION: 96 % | DIASTOLIC BLOOD PRESSURE: 88 MMHG | BODY MASS INDEX: 32.4 KG/M2 | WEIGHT: 213.8 LBS

## 2021-10-06 DIAGNOSIS — G43.109 OCULAR MIGRAINE: ICD-10-CM

## 2021-10-06 DIAGNOSIS — K21.00 GASTROESOPHAGEAL REFLUX DISEASE WITH ESOPHAGITIS WITHOUT HEMORRHAGE: ICD-10-CM

## 2021-10-06 DIAGNOSIS — G45.9 TIA (TRANSIENT ISCHEMIC ATTACK): ICD-10-CM

## 2021-10-06 DIAGNOSIS — F41.9 ANXIETY: ICD-10-CM

## 2021-10-06 DIAGNOSIS — H53.9 EPISODE OF VISUAL DISTURBANCE: Primary | ICD-10-CM

## 2021-10-06 DIAGNOSIS — I10 PRIMARY HYPERTENSION: ICD-10-CM

## 2021-10-06 DIAGNOSIS — E78.2 MIXED HYPERLIPIDEMIA: ICD-10-CM

## 2021-10-06 DIAGNOSIS — N32.81 OVERACTIVE BLADDER: Primary | ICD-10-CM

## 2021-10-06 DIAGNOSIS — F32.A DEPRESSION, UNSPECIFIED DEPRESSION TYPE: ICD-10-CM

## 2021-10-06 DIAGNOSIS — N32.81 OVERACTIVE BLADDER: ICD-10-CM

## 2021-10-06 PROCEDURE — 99214 OFFICE O/P EST MOD 30 MIN: CPT | Performed by: FAMILY MEDICINE

## 2021-10-06 RX ORDER — ASPIRIN 81 MG/1
81 TABLET ORAL DAILY
Qty: 30 TABLET | Refills: 5
Start: 2021-10-06

## 2021-10-06 RX ORDER — ESCITALOPRAM OXALATE 20 MG/1
30 TABLET ORAL DAILY
Qty: 145 TABLET | Refills: 1 | Status: SHIPPED | OUTPATIENT
Start: 2021-10-06

## 2021-10-06 RX ORDER — METOPROLOL SUCCINATE 100 MG/1
100 TABLET, EXTENDED RELEASE ORAL DAILY
Qty: 90 TABLET | Refills: 1 | Status: SHIPPED | OUTPATIENT
Start: 2021-10-06

## 2021-10-06 RX ORDER — OXYBUTYNIN CHLORIDE 10 MG/1
10 TABLET, EXTENDED RELEASE ORAL
Qty: 90 TABLET | Refills: 3 | Status: SHIPPED | OUTPATIENT
Start: 2021-10-06

## 2021-10-06 RX ORDER — LANSOPRAZOLE 30 MG/1
30 CAPSULE, DELAYED RELEASE ORAL DAILY
Qty: 90 CAPSULE | Refills: 1 | Status: SHIPPED | OUTPATIENT
Start: 2021-10-06

## 2021-10-07 ENCOUNTER — APPOINTMENT (OUTPATIENT)
Dept: LAB | Facility: CLINIC | Age: 76
End: 2021-10-07
Payer: MEDICARE

## 2021-10-07 DIAGNOSIS — G43.109 OCULAR MIGRAINE: ICD-10-CM

## 2021-10-07 DIAGNOSIS — I10 PRIMARY HYPERTENSION: ICD-10-CM

## 2021-10-07 DIAGNOSIS — G45.9 TIA (TRANSIENT ISCHEMIC ATTACK): ICD-10-CM

## 2021-10-07 DIAGNOSIS — H53.9 EPISODE OF VISUAL DISTURBANCE: ICD-10-CM

## 2021-10-07 DIAGNOSIS — E78.2 MIXED HYPERLIPIDEMIA: ICD-10-CM

## 2021-10-07 LAB
ALBUMIN SERPL BCP-MCNC: 3.6 G/DL (ref 3.5–5)
ALP SERPL-CCNC: 77 U/L (ref 46–116)
ALT SERPL W P-5'-P-CCNC: 32 U/L (ref 12–78)
ANION GAP SERPL CALCULATED.3IONS-SCNC: 5 MMOL/L (ref 4–13)
AST SERPL W P-5'-P-CCNC: 23 U/L (ref 5–45)
BACTERIA UR QL AUTO: ABNORMAL /HPF
BASOPHILS # BLD AUTO: 0.04 THOUSANDS/ΜL (ref 0–0.1)
BASOPHILS NFR BLD AUTO: 1 % (ref 0–1)
BILIRUB SERPL-MCNC: 0.54 MG/DL (ref 0.2–1)
BILIRUB UR QL STRIP: NEGATIVE
BUN SERPL-MCNC: 18 MG/DL (ref 5–25)
CALCIUM SERPL-MCNC: 10.2 MG/DL (ref 8.3–10.1)
CHLORIDE SERPL-SCNC: 103 MMOL/L (ref 100–108)
CHOLEST SERPL-MCNC: 192 MG/DL (ref 50–200)
CLARITY UR: CLEAR
CO2 SERPL-SCNC: 29 MMOL/L (ref 21–32)
COLOR UR: YELLOW
CREAT SERPL-MCNC: 0.74 MG/DL (ref 0.6–1.3)
EOSINOPHIL # BLD AUTO: 0.15 THOUSAND/ΜL (ref 0–0.61)
EOSINOPHIL NFR BLD AUTO: 2 % (ref 0–6)
ERYTHROCYTE [DISTWIDTH] IN BLOOD BY AUTOMATED COUNT: 11.9 % (ref 11.6–15.1)
GFR SERPL CREATININE-BSD FRML MDRD: 80 ML/MIN/1.73SQ M
GLUCOSE P FAST SERPL-MCNC: 107 MG/DL (ref 65–99)
GLUCOSE UR STRIP-MCNC: NEGATIVE MG/DL
HCT VFR BLD AUTO: 47.3 % (ref 34.8–46.1)
HDLC SERPL-MCNC: 39 MG/DL
HGB BLD-MCNC: 15.5 G/DL (ref 11.5–15.4)
HGB UR QL STRIP.AUTO: NEGATIVE
HYALINE CASTS #/AREA URNS LPF: ABNORMAL /LPF
IMM GRANULOCYTES # BLD AUTO: 0.06 THOUSAND/UL (ref 0–0.2)
IMM GRANULOCYTES NFR BLD AUTO: 1 % (ref 0–2)
KETONES UR STRIP-MCNC: NEGATIVE MG/DL
LDLC SERPL CALC-MCNC: 94 MG/DL (ref 0–100)
LDLC SERPL DIRECT ASSAY-MCNC: 115 MG/DL (ref 0–100)
LEUKOCYTE ESTERASE UR QL STRIP: ABNORMAL
LYMPHOCYTES # BLD AUTO: 1.67 THOUSANDS/ΜL (ref 0.6–4.47)
LYMPHOCYTES NFR BLD AUTO: 23 % (ref 14–44)
MCH RBC QN AUTO: 29.8 PG (ref 26.8–34.3)
MCHC RBC AUTO-ENTMCNC: 32.8 G/DL (ref 31.4–37.4)
MCV RBC AUTO: 91 FL (ref 82–98)
MONOCYTES # BLD AUTO: 0.47 THOUSAND/ΜL (ref 0.17–1.22)
MONOCYTES NFR BLD AUTO: 7 % (ref 4–12)
NEUTROPHILS # BLD AUTO: 4.86 THOUSANDS/ΜL (ref 1.85–7.62)
NEUTS SEG NFR BLD AUTO: 66 % (ref 43–75)
NITRITE UR QL STRIP: NEGATIVE
NON-SQ EPI CELLS URNS QL MICRO: ABNORMAL /HPF
NONHDLC SERPL-MCNC: 153 MG/DL
NRBC BLD AUTO-RTO: 0 /100 WBCS
PH UR STRIP.AUTO: 6.5 [PH]
PLATELET # BLD AUTO: 187 THOUSANDS/UL (ref 149–390)
PMV BLD AUTO: 10.8 FL (ref 8.9–12.7)
POTASSIUM SERPL-SCNC: 4.6 MMOL/L (ref 3.5–5.3)
PROT SERPL-MCNC: 7.8 G/DL (ref 6.4–8.2)
PROT UR STRIP-MCNC: NEGATIVE MG/DL
RBC # BLD AUTO: 5.2 MILLION/UL (ref 3.81–5.12)
RBC #/AREA URNS AUTO: ABNORMAL /HPF
SODIUM SERPL-SCNC: 137 MMOL/L (ref 136–145)
SP GR UR STRIP.AUTO: 1.01 (ref 1–1.03)
TRIGL SERPL-MCNC: 296 MG/DL
TSH SERPL DL<=0.05 MIU/L-ACNC: 2.85 UIU/ML (ref 0.36–3.74)
UROBILINOGEN UR QL STRIP.AUTO: 0.2 E.U./DL
WBC # BLD AUTO: 7.25 THOUSAND/UL (ref 4.31–10.16)
WBC #/AREA URNS AUTO: ABNORMAL /HPF

## 2021-10-07 PROCEDURE — 84443 ASSAY THYROID STIM HORMONE: CPT

## 2021-10-07 PROCEDURE — 80053 COMPREHEN METABOLIC PANEL: CPT

## 2021-10-07 PROCEDURE — 80061 LIPID PANEL: CPT

## 2021-10-07 PROCEDURE — 36415 COLL VENOUS BLD VENIPUNCTURE: CPT

## 2021-10-07 PROCEDURE — 83721 ASSAY OF BLOOD LIPOPROTEIN: CPT

## 2021-10-07 PROCEDURE — 81001 URINALYSIS AUTO W/SCOPE: CPT

## 2021-10-07 PROCEDURE — 85025 COMPLETE CBC W/AUTO DIFF WBC: CPT

## 2021-10-10 PROBLEM — H53.9 EPISODE OF VISUAL DISTURBANCE: Status: ACTIVE | Noted: 2021-10-10

## 2021-10-10 PROBLEM — G43.109 OCULAR MIGRAINE: Status: ACTIVE | Noted: 2021-10-10

## 2021-10-26 ENCOUNTER — HOSPITAL ENCOUNTER (OUTPATIENT)
Dept: MRI IMAGING | Facility: HOSPITAL | Age: 76
Discharge: HOME | End: 2021-10-26
Payer: MEDICARE

## 2021-10-26 ENCOUNTER — HOSPITAL ENCOUNTER (OUTPATIENT)
Dept: NON INVASIVE DIAGNOSTICS | Facility: HOSPITAL | Age: 76
Discharge: HOME | End: 2021-10-26
Payer: MEDICARE

## 2021-10-26 DIAGNOSIS — G45.9 TIA (TRANSIENT ISCHEMIC ATTACK): ICD-10-CM

## 2021-10-26 DIAGNOSIS — H53.9 EPISODE OF VISUAL DISTURBANCE: ICD-10-CM

## 2021-10-26 DIAGNOSIS — G43.109 OCULAR MIGRAINE: ICD-10-CM

## 2021-10-26 PROCEDURE — 93880 EXTRACRANIAL BILAT STUDY: CPT | Performed by: INTERNAL MEDICINE

## 2021-10-26 PROCEDURE — 93880 EXTRACRANIAL BILAT STUDY: CPT

## 2021-10-26 PROCEDURE — A9585 GADOBUTROL INJECTION: HCPCS | Performed by: FAMILY MEDICINE

## 2021-10-26 PROCEDURE — 70553 MRI BRAIN STEM W/O & W/DYE: CPT

## 2021-10-26 PROCEDURE — G1004 CDSM NDSC: HCPCS

## 2021-10-26 RX ADMIN — GADOBUTROL 9 ML: 604.72 INJECTION INTRAVENOUS at 16:02

## 2021-11-23 ENCOUNTER — OFFICE VISIT (OUTPATIENT)
Dept: FAMILY MEDICINE CLINIC | Facility: CLINIC | Age: 76
End: 2021-11-23
Payer: MEDICARE

## 2021-11-23 VITALS
RESPIRATION RATE: 16 BRPM | DIASTOLIC BLOOD PRESSURE: 72 MMHG | HEIGHT: 66 IN | BODY MASS INDEX: 34.91 KG/M2 | HEART RATE: 62 BPM | WEIGHT: 217.2 LBS | TEMPERATURE: 98.1 F | OXYGEN SATURATION: 95 % | SYSTOLIC BLOOD PRESSURE: 100 MMHG

## 2021-11-23 DIAGNOSIS — E78.2 MIXED HYPERLIPIDEMIA: Primary | ICD-10-CM

## 2021-11-23 DIAGNOSIS — F41.9 ANXIETY: ICD-10-CM

## 2021-11-23 DIAGNOSIS — F32.A DEPRESSION, UNSPECIFIED DEPRESSION TYPE: ICD-10-CM

## 2021-11-23 DIAGNOSIS — I10 PRIMARY HYPERTENSION: ICD-10-CM

## 2021-11-23 DIAGNOSIS — I65.23 BILATERAL CAROTID ARTERY STENOSIS: ICD-10-CM

## 2021-11-23 PROCEDURE — 99214 OFFICE O/P EST MOD 30 MIN: CPT | Performed by: FAMILY MEDICINE

## 2021-11-23 RX ORDER — ROSUVASTATIN CALCIUM 10 MG/1
10 TABLET, COATED ORAL DAILY
Qty: 90 TABLET | Refills: 1 | Status: SHIPPED | OUTPATIENT
Start: 2021-11-23

## 2021-11-24 PROBLEM — I65.23 BILATERAL CAROTID ARTERY STENOSIS: Status: ACTIVE | Noted: 2021-11-24

## 2022-03-11 ENCOUNTER — TELEPHONE (OUTPATIENT)
Dept: FAMILY MEDICINE CLINIC | Facility: CLINIC | Age: 77
End: 2022-03-11

## 2022-03-11 NOTE — TELEPHONE ENCOUNTER
Please remove Dr Catherine Bains as patient's pcp, we had received a record request for [facility] [place]   Thank you

## 2022-05-25 ENCOUNTER — TELEPHONE (OUTPATIENT)
Dept: GASTROENTEROLOGY | Facility: CLINIC | Age: 77
End: 2022-05-25

## 2022-05-25 NOTE — TELEPHONE ENCOUNTER
Pt questions date of her last colonoscopy and recall date  Advised last colon was 5/18/2018 w/ 5-yr recall due in 2023  She states she wanted info for her regular Dr because she is having problems w/ her bowels

## 2022-06-07 NOTE — TELEPHONE ENCOUNTER
06/07/22 2:20 PM     Thank you for your request  Your request has been received, reviewed, and the patient chart updated  The PCP has successfully been removed with a patient attribution note  This message will now be completed      Thank you  Lola Butts

## 2022-07-29 ENCOUNTER — HOSPITAL ENCOUNTER (OUTPATIENT)
Dept: MAMMOGRAPHY | Facility: CLINIC | Age: 77
Discharge: HOME | End: 2022-07-29
Payer: MEDICARE

## 2022-07-29 VITALS — WEIGHT: 212 LBS | BODY MASS INDEX: 34.07 KG/M2 | HEIGHT: 66 IN

## 2022-07-29 DIAGNOSIS — Z12.31 ENCOUNTER FOR SCREENING MAMMOGRAM FOR MALIGNANT NEOPLASM OF BREAST: ICD-10-CM

## 2022-07-29 PROCEDURE — 77067 SCR MAMMO BI INCL CAD: CPT

## 2022-07-29 PROCEDURE — 77063 BREAST TOMOSYNTHESIS BI: CPT

## 2023-03-17 ENCOUNTER — PREP FOR PROCEDURE (OUTPATIENT)
Dept: GASTROENTEROLOGY | Facility: CLINIC | Age: 78
End: 2023-03-17

## 2023-03-17 ENCOUNTER — TELEPHONE (OUTPATIENT)
Dept: GASTROENTEROLOGY | Facility: CLINIC | Age: 78
End: 2023-03-17

## 2023-03-17 DIAGNOSIS — Z86.010 HISTORY OF COLON POLYPS: Primary | ICD-10-CM

## 2023-03-17 NOTE — TELEPHONE ENCOUNTER
Scheduled date of colonoscopy (as of today):5/16/23  Physician performing colonoscopy:INDIRA  Location of colonoscopy:[facility]  Clearances: NA

## 2023-03-17 NOTE — TELEPHONE ENCOUNTER
03/17/23  Screened by: Jodi Aguiar    Referring Provider Marlena Peñaloza    Pre- Screening: There is no height or weight on file to calculate BMI  Has patient been referred for a routine screening Colonoscopy? yes  Is the patient between 39-70 years old? yes      Previous Colonoscopy yes   If yes:    Date: 5/18/18    Facility:     Reason:       SCHEDULING STAFF: If the patient is between 45yrs-49yrs, please advise patient to confirm benefits/coverage with their insurance company for a routine screening colonoscopy, some insurance carriers will only cover at [address] or older  If the patient is over 66years old, please schedule an office visit  Does the patient want to see a Gastroenterologist prior to their procedure OR are they having any GI symptoms? no    Has the patient been hospitalized or had abdominal surgery in the past 6 months? no    Does the patient use supplemental oxygen? no    Does the patient take Coumadin, Lovenox, Plavix, Elliquis, Xarelto, or other blood thinning medication? no    Has the patient had a stroke, cardiac event, or stent placed in the past year? no     PT PASSED OA    SCHEDULING STAFF: If patient answers NO to above questions, then schedule procedure  If patient answers YES to above questions, then schedule office appointment  If patient is between 45yrs - 49yrs, please advise patient that we will have to confirm benefits & coverage with their insurance company for a routine screening colonoscopy

## 2023-05-03 ENCOUNTER — TELEPHONE (OUTPATIENT)
Dept: GASTROENTEROLOGY | Facility: CLINIC | Age: 78
End: 2023-05-03

## 2023-05-03 DIAGNOSIS — Z12.11 SCREENING FOR COLON CANCER: Primary | ICD-10-CM

## 2023-05-04 DIAGNOSIS — Z12.11 SCREENING FOR COLON CANCER: Primary | ICD-10-CM

## 2023-05-04 NOTE — TELEPHONE ENCOUNTER
Patients spouse calling in regards to the Gol[place]  Medication was sent to [facility]  Needs the prescription sent to [facility] in [place]

## 2023-05-16 ENCOUNTER — ANESTHESIA (OUTPATIENT)
Dept: GASTROENTEROLOGY | Facility: AMBULATORY SURGERY CENTER | Age: 78
End: 2023-05-16

## 2023-05-16 ENCOUNTER — HOSPITAL ENCOUNTER (OUTPATIENT)
Dept: GASTROENTEROLOGY | Facility: AMBULATORY SURGERY CENTER | Age: 78
Discharge: HOME | End: 2023-05-16

## 2023-05-16 ENCOUNTER — ANESTHESIA EVENT (OUTPATIENT)
Dept: GASTROENTEROLOGY | Facility: AMBULATORY SURGERY CENTER | Age: 78
End: 2023-05-16

## 2023-05-16 VITALS
RESPIRATION RATE: 22 BRPM | WEIGHT: 209 LBS | HEART RATE: 60 BPM | BODY MASS INDEX: 33.59 KG/M2 | TEMPERATURE: 98 F | HEIGHT: 66 IN | SYSTOLIC BLOOD PRESSURE: 147 MMHG | DIASTOLIC BLOOD PRESSURE: 63 MMHG | OXYGEN SATURATION: 93 %

## 2023-05-16 DIAGNOSIS — Z86.010 HISTORY OF COLON POLYPS: ICD-10-CM

## 2023-05-16 RX ORDER — SODIUM CHLORIDE 9 MG/ML
INJECTION, SOLUTION INTRAVENOUS CONTINUOUS PRN
Status: DISCONTINUED | OUTPATIENT
Start: 2023-05-16 | End: 2023-05-16

## 2023-05-16 RX ORDER — SODIUM CHLORIDE, SODIUM LACTATE, POTASSIUM CHLORIDE, CALCIUM CHLORIDE 600; 310; 30; 20 MG/100ML; MG/100ML; MG/100ML; MG/100ML
50 INJECTION, SOLUTION INTRAVENOUS CONTINUOUS
Status: DISCONTINUED | OUTPATIENT
Start: 2023-05-16 | End: 2023-05-20 | Stop reason: HOSPADM

## 2023-05-16 RX ORDER — PROPOFOL 10 MG/ML
INJECTION, EMULSION INTRAVENOUS AS NEEDED
Status: DISCONTINUED | OUTPATIENT
Start: 2023-05-16 | End: 2023-05-16

## 2023-05-16 RX ORDER — LIDOCAINE HYDROCHLORIDE 10 MG/ML
INJECTION, SOLUTION EPIDURAL; INFILTRATION; INTRACAUDAL; PERINEURAL AS NEEDED
Status: DISCONTINUED | OUTPATIENT
Start: 2023-05-16 | End: 2023-05-16

## 2023-05-16 RX ORDER — GLYCOPYRROLATE 0.2 MG/ML
INJECTION INTRAMUSCULAR; INTRAVENOUS AS NEEDED
Status: DISCONTINUED | OUTPATIENT
Start: 2023-05-16 | End: 2023-05-16

## 2023-05-16 RX ADMIN — PROPOFOL 20 MG: 10 INJECTION, EMULSION INTRAVENOUS at 08:05

## 2023-05-16 RX ADMIN — PROPOFOL 100 MG: 10 INJECTION, EMULSION INTRAVENOUS at 07:24

## 2023-05-16 RX ADMIN — PROPOFOL 20 MG: 10 INJECTION, EMULSION INTRAVENOUS at 07:55

## 2023-05-16 RX ADMIN — PROPOFOL 40 MG: 10 INJECTION, EMULSION INTRAVENOUS at 07:26

## 2023-05-16 RX ADMIN — GLYCOPYRROLATE 0.2 MCG: 0.2 INJECTION INTRAMUSCULAR; INTRAVENOUS at 07:40

## 2023-05-16 RX ADMIN — SODIUM CHLORIDE, SODIUM LACTATE, POTASSIUM CHLORIDE, CALCIUM CHLORIDE 50 ML/HR: 600; 310; 30; 20 INJECTION, SOLUTION INTRAVENOUS at 07:12

## 2023-05-16 RX ADMIN — PROPOFOL 20 MG: 10 INJECTION, EMULSION INTRAVENOUS at 08:02

## 2023-05-16 RX ADMIN — PROPOFOL 20 MG: 10 INJECTION, EMULSION INTRAVENOUS at 07:36

## 2023-05-16 RX ADMIN — PROPOFOL 20 MG: 10 INJECTION, EMULSION INTRAVENOUS at 08:08

## 2023-05-16 RX ADMIN — PROPOFOL 40 MG: 10 INJECTION, EMULSION INTRAVENOUS at 07:29

## 2023-05-16 RX ADMIN — PROPOFOL 30 MG: 10 INJECTION, EMULSION INTRAVENOUS at 07:42

## 2023-05-16 RX ADMIN — PROPOFOL 20 MG: 10 INJECTION, EMULSION INTRAVENOUS at 07:32

## 2023-05-16 RX ADMIN — LIDOCAINE HYDROCHLORIDE 50 MG: 10 INJECTION, SOLUTION EPIDURAL; INFILTRATION; INTRACAUDAL; PERINEURAL at 07:24

## 2023-05-16 RX ADMIN — PROPOFOL 30 MG: 10 INJECTION, EMULSION INTRAVENOUS at 07:48

## 2023-05-16 RX ADMIN — PROPOFOL 30 MG: 10 INJECTION, EMULSION INTRAVENOUS at 07:39

## 2023-05-16 RX ADMIN — PROPOFOL 20 MG: 10 INJECTION, EMULSION INTRAVENOUS at 07:59

## 2023-05-16 NOTE — ANESTHESIA POSTPROCEDURE EVALUATION
Post-Op Assessment Note    CV Status:  Stable  Pain Score: 0    Pain management: adequate     Mental Status:  Awake and alert   Hydration Status:  Euvolemic   PONV Controlled:  Controlled   Airway Patency:  Patent      Post Op Vitals Reviewed: Yes      Staff: Anesthesiologist, CRNA         No notable events documented      BP  128/57   Temp   n/a   Pulse 64   Resp  16   SpO2   90

## 2023-05-16 NOTE — ANESTHESIA PREPROCEDURE EVALUATION
Procedure:  COLONOSCOPY    Relevant Problems   ANESTHESIA (within normal limits)      CARDIO  remote cath 2016 mild CAD, normal EF   (+) Bilateral carotid artery stenosis   (+) Hyperlipidemia   (+) Hypertension   (+) Ocular migraine      GI/HEPATIC   (+) Nonalcoholic fatty liver disease      NEURO/PSYCH   (+) Anxiety   (+) Depression   (+) Ocular migraine      PULMONARY   (+) Exercise-induced asthma   (+) Sleep apnea (not currently using CPAP)   (-) Smoking   (-) URI (upper respiratory infection)    BMI 33    Physical Exam    Airway    Mallampati score: II  TM Distance: >3 FB       Dental   upper dentures and lower dentures,     Cardiovascular      Pulmonary      Other Findings       Lab Results   Component Value Date    WBC 7 25 10/07/2021    HGB 15 5 (H) 10/07/2021     10/07/2021     Lab Results   Component Value Date    SODIUM 137 10/07/2021    K 4 6 10/07/2021    BUN 18 10/07/2021    CREATININE 0 74 10/07/2021    EGFR 80 10/07/2021    GLUCOSE 97 01/05/2015     Anesthesia Plan  ASA Score- 2     Anesthesia Type- IV sedation with anesthesia with ASA Monitors  Additional Monitors:   Airway Plan:           Plan Factors-Exercise tolerance (METS): >4 METS  Chart reviewed  Existing labs reviewed  Patient summary reviewed  Patient is not a current smoker  Induction- intravenous  Postoperative Plan-     Informed Consent- Anesthetic plan and risks discussed with patient  I personally reviewed this patient with the CRNA  Discussed and agreed on the Anesthesia Plan with the CRNA  Tomasz Oleary

## 2023-05-16 NOTE — H&P
History and Physical - [facility]  Edith Hodges 69 y o  female MRN: [id number]    HPI: Edith Hodges is a 69y o  year old female who presents for colonoscopy for history of polyps  Patient has had worsening constipation recently  REVIEW OF SYSTEMS: Per the HPI, and otherwise unremarkable      Historical Information   Past Medical History:   Diagnosis Date   • Anxiety    • Colon polyp    • Depression    • Hyperlipidemia    • Hypertension    • Melanoma ([address] ) 2019   • Overactive bladder      Past Surgical History:   Procedure Laterality Date   • ABDOMINOPLASTY     • BREAST BIOPSY Right 1962    excisional biopsy-benign   • CATARACT EXTRACTION, BILATERAL     • COLONOSCOPY     • INCONTINENCE SURGERY     • REPLACEMENT TOTAL KNEE Right 2007   • TUBAL LIGATION     • VEIN LIGATION AND STRIPPING       Social History   Social History     Substance and Sexual Activity   Alcohol Use Yes    Comment: rarely     Social History     Substance and Sexual Activity   Drug Use No     Social History     Tobacco Use   Smoking Status Never   Smokeless Tobacco Never     Family History   Problem Relation Age of Onset   • No Known Problems Mother    • No Known Problems Father    • No Known Problems Sister    • No Known Problems Sister    • No Known Problems Sister    • Melanoma Daughter         19's   • No Known Problems Maternal Grandmother    • No Known Problems Maternal Grandfather    • No Known Problems Paternal Grandmother    • No Known Problems Paternal Grandfather    • No Known Problems Maternal Aunt        Meds/Allergies       Current Outpatient Medications:   •  Calcium Carb-Cholecalciferol (CALCIUM 1000 + D PO)  •  escitalopram (LEXAPRO) 20 mg tablet  •  lansoprazole (PREVACID) 30 mg capsule  •  metoprolol succinate (TOPROL-XL) 100 mg 24 hr tablet  •  oxybutynin (DITROPAN-XL) 10 MG 24 hr tablet  •  rosuvastatin (CRESTOR) 10 MG tablet  •  aspirin (ECOTRIN LOW STRENGTH) 81 mg EC tablet  • "polyethylene glycol (GOLYTELY) 4000 mL solution    Current Facility-Administered Medications:   •  lactated ringers infusion, 50 mL/hr, Intravenous, Continuous, Continue from Pre-op at 05/16/23 4645    Allergies   Allergen Reactions   • Trospium      Other reaction(s): Constipation  Annotation - [id number]: mouth ulcers   • Vioxx [Rofecoxib] GI Intolerance       Objective     BP (!) 180/77   Pulse (!) 54   Temp 98 °F (36 7 °C) (Temporal)   Resp (!) 25   Ht 5' 6\" (1 676 m)   Wt 94 8 kg (209 lb)   SpO2 93%   BMI 33 73 kg/m²     PHYSICAL EXAM    Gen: NAD AAOx3  Head: Normocephalic, Atraumatic  CV: S1S2 RRR no m/r/g  CHEST: Clear b/l no c/r/w  ABD: soft, +BS NT/ND  EXT: no edema    ASSESSMENT/PLAN:  This is a 69y o  year old female here for colonoscopy, and she is stable and optimized for her procedure        "

## 2023-05-23 PROCEDURE — 88305 TISSUE EXAM BY PATHOLOGIST: CPT | Performed by: PATHOLOGY

## 2023-05-23 NOTE — RESULT ENCOUNTER NOTE
Spoke with patient  Polyps benign  No recall colonoscopy due to age    Routine office visit follow-up

## 2023-10-04 ENCOUNTER — HOSPITAL ENCOUNTER (OUTPATIENT)
Dept: MAMMOGRAPHY | Facility: CLINIC | Age: 78
Discharge: HOME | End: 2023-10-04
Payer: MEDICARE

## 2023-10-04 ENCOUNTER — HOSPITAL ENCOUNTER (OUTPATIENT)
Dept: BONE DENSITY | Facility: CLINIC | Age: 78
Discharge: HOME | End: 2023-10-04
Payer: MEDICARE

## 2023-10-04 VITALS — WEIGHT: 209 LBS | HEIGHT: 66 IN | BODY MASS INDEX: 33.59 KG/M2

## 2023-10-04 VITALS — BODY MASS INDEX: 33.59 KG/M2 | HEIGHT: 66 IN | WEIGHT: 209 LBS

## 2023-10-04 DIAGNOSIS — N95.8 POSTARTIFICIAL MENOPAUSAL SYNDROME: ICD-10-CM

## 2023-10-04 DIAGNOSIS — Z12.31 ENCOUNTER FOR SCREENING MAMMOGRAM FOR MALIGNANT NEOPLASM OF BREAST: ICD-10-CM

## 2023-10-04 PROCEDURE — 77067 SCR MAMMO BI INCL CAD: CPT

## 2023-10-04 PROCEDURE — 77063 BREAST TOMOSYNTHESIS BI: CPT

## 2023-10-04 PROCEDURE — 77080 DXA BONE DENSITY AXIAL: CPT

## 2025-01-28 ENCOUNTER — HOSPITAL ENCOUNTER (OUTPATIENT)
Dept: MAMMOGRAPHY | Facility: CLINIC | Age: 80
Discharge: HOME | End: 2025-01-28
Payer: MEDICARE

## 2025-01-28 VITALS — BODY MASS INDEX: 33.43 KG/M2 | WEIGHT: 208 LBS | HEIGHT: 66 IN

## 2025-01-28 DIAGNOSIS — Z12.31 ENCOUNTER FOR SCREENING MAMMOGRAM FOR MALIGNANT NEOPLASM OF BREAST: ICD-10-CM

## 2025-01-28 PROCEDURE — 77067 SCR MAMMO BI INCL CAD: CPT

## 2025-01-28 PROCEDURE — 77063 BREAST TOMOSYNTHESIS BI: CPT
